# Patient Record
Sex: FEMALE | Race: WHITE | HISPANIC OR LATINO | Employment: PART TIME | URBAN - NONMETROPOLITAN AREA
[De-identification: names, ages, dates, MRNs, and addresses within clinical notes are randomized per-mention and may not be internally consistent; named-entity substitution may affect disease eponyms.]

---

## 2017-11-29 ENCOUNTER — COMMUNICATION - GICH (OUTPATIENT)
Dept: OBGYN | Facility: OTHER | Age: 27
End: 2017-11-29

## 2017-11-29 DIAGNOSIS — Z34.90 ENCOUNTER FOR SUPERVISION OF NORMAL PREGNANCY: ICD-10-CM

## 2017-12-21 ENCOUNTER — PRENATAL OFFICE VISIT - GICH (OUTPATIENT)
Dept: OBGYN | Facility: OTHER | Age: 27
End: 2017-12-21

## 2017-12-21 ENCOUNTER — HISTORY (OUTPATIENT)
Dept: OBGYN | Facility: OTHER | Age: 27
End: 2017-12-21

## 2017-12-21 ENCOUNTER — HOSPITAL ENCOUNTER (OUTPATIENT)
Dept: RADIOLOGY | Facility: OTHER | Age: 27
End: 2017-12-21

## 2017-12-21 ENCOUNTER — AMBULATORY - GICH (OUTPATIENT)
Dept: OBGYN | Facility: OTHER | Age: 27
End: 2017-12-21

## 2017-12-21 DIAGNOSIS — Z34.01 ENCOUNTER FOR SUPERVISION OF NORMAL FIRST PREGNANCY IN FIRST TRIMESTER: ICD-10-CM

## 2017-12-21 DIAGNOSIS — Z34.90 ENCOUNTER FOR SUPERVISION OF NORMAL PREGNANCY: ICD-10-CM

## 2017-12-21 LAB
ABORH - HISTORICAL: NORMAL
ABSOLUTE BASOPHILS - HISTORICAL: 0 THOU/CU MM
ABSOLUTE EOSINOPHILS - HISTORICAL: 0 THOU/CU MM
ABSOLUTE IMMATURE GRANULOCYTES(METAS,MYELOS,PROS) - HISTORICAL: 0 THOU/CU MM
ABSOLUTE LYMPHOCYTES - HISTORICAL: 1.8 THOU/CU MM (ref 0.9–2.9)
ABSOLUTE MONOCYTES - HISTORICAL: 0.6 THOU/CU MM
ABSOLUTE NEUTROPHILS - HISTORICAL: 11.5 THOU/CU MM (ref 1.7–7)
ANTIBODY SCREEN - HISTORICAL: NEGATIVE
BASOPHILS # BLD AUTO: 0.2 %
EOSINOPHIL NFR BLD AUTO: 0.1 %
ERYTHROCYTE [DISTWIDTH] IN BLOOD BY AUTOMATED COUNT: 12.7 % (ref 11.5–15.5)
HBSAG CATEGORY - HISTORICAL: NONREACTIVE
HCT VFR BLD AUTO: 40.7 % (ref 33–51)
HEMOGLOBIN: 14.1 G/DL (ref 12–16)
HIV-1/HIV-2 ANTIBODY CATEGORY - HISTORICAL: NORMAL
IMMATURE GRANULOCYTES(METAS,MYELOS,PROS) - HISTORICAL: 0.3 %
LYMPHOCYTES NFR BLD AUTO: 12.8 % (ref 20–44)
MCH RBC QN AUTO: 29.9 PG (ref 26–34)
MCHC RBC AUTO-ENTMCNC: 34.6 G/DL (ref 32–36)
MCV RBC AUTO: 86 FL (ref 80–100)
MONOCYTES NFR BLD AUTO: 4 %
NEUTROPHILS NFR BLD AUTO: 82.6 % (ref 42–72)
PLATELET # BLD AUTO: 331 THOU/CU MM (ref 140–440)
PMV BLD: 10.6 FL (ref 6.5–11)
RED BLOOD COUNT - HISTORICAL: 4.72 MIL/CU MM (ref 4–5.2)
RUBELLA COMMENT - HISTORICAL: NORMAL
SPECIMEN EXPIRATION DATE/TIME - HISTORICAL: NORMAL
WHITE BLOOD COUNT - HISTORICAL: 13.9 THOU/CU MM (ref 4.5–11)

## 2017-12-21 ASSESSMENT — PATIENT HEALTH QUESTIONNAIRE - PHQ9: SUM OF ALL RESPONSES TO PHQ QUESTIONS 1-9: 9

## 2017-12-22 ENCOUNTER — AMBULATORY - GICH (OUTPATIENT)
Dept: SCHEDULING | Facility: OTHER | Age: 27
End: 2017-12-22

## 2017-12-22 LAB — TREPONEMA PALLIDUM - HISTORICAL: NEGATIVE

## 2017-12-28 NOTE — TELEPHONE ENCOUNTER
Patient Information     Patient Name MRN Tracey Bhakta [id number] Female 1990      Telephone Encounter by Gauri Spear at 2017  3:41 PM     Author:  Gauri Spear Service:  (none) Author Type:  (none)     Filed:  2017  3:45 PM Encounter Date:  2017 Status:  Signed     :  Gauri Spear            Patient is scheduled with [facility] for her Initial Ob Physical on 2017 with an LMP of 10/22/2017. Patient would like an Ultrasound prior to appointment. Is this something we are able to do? Please advise

## 2018-01-05 ENCOUNTER — COMMUNICATION - GICH (OUTPATIENT)
Dept: OBGYN | Facility: OTHER | Age: 28
End: 2018-01-05

## 2018-01-05 ENCOUNTER — AMBULATORY - GICH (OUTPATIENT)
Dept: OBGYN | Facility: OTHER | Age: 28
End: 2018-01-05

## 2018-01-05 ENCOUNTER — HOSPITAL ENCOUNTER (OUTPATIENT)
Dept: RADIOLOGY | Facility: OTHER | Age: 28
End: 2018-01-05

## 2018-01-05 ENCOUNTER — PRENATAL OFFICE VISIT - GICH (OUTPATIENT)
Dept: OBGYN | Facility: OTHER | Age: 28
End: 2018-01-05

## 2018-01-05 ENCOUNTER — HISTORY (OUTPATIENT)
Dept: OBGYN | Facility: OTHER | Age: 28
End: 2018-01-05

## 2018-01-05 DIAGNOSIS — O03.4 INCOMPLETE SPONTANEOUS ABORTION WITHOUT COMPLICATION: ICD-10-CM

## 2018-01-05 DIAGNOSIS — O20.0 THREATENED ABORTION: ICD-10-CM

## 2018-01-19 ENCOUNTER — AMBULATORY - GICH (OUTPATIENT)
Dept: SCHEDULING | Facility: OTHER | Age: 28
End: 2018-01-19

## 2018-01-23 ENCOUNTER — AMBULATORY - GICH (OUTPATIENT)
Dept: SCHEDULING | Facility: OTHER | Age: 28
End: 2018-01-23

## 2018-02-09 VITALS — HEART RATE: 80 BPM | DIASTOLIC BLOOD PRESSURE: 64 MMHG | WEIGHT: 113.6 LBS | SYSTOLIC BLOOD PRESSURE: 104 MMHG

## 2018-02-09 VITALS — SYSTOLIC BLOOD PRESSURE: 118 MMHG | TEMPERATURE: 98.9 F | DIASTOLIC BLOOD PRESSURE: 68 MMHG | HEART RATE: 88 BPM

## 2018-02-11 ASSESSMENT — PATIENT HEALTH QUESTIONNAIRE - PHQ9: SUM OF ALL RESPONSES TO PHQ QUESTIONS 1-9: 9

## 2018-02-12 NOTE — PROGRESS NOTES
Patient Information     Patient Name MRN Sex Tracey Centeno [id number] Female 1990      Progress Notes by Alla Bustamante MD at 2018  9:30 AM     Author:  Alla Bustamante MD  Service:  (none) Author Type:  Physician     Filed:  2018  1:44 PM  Encounter Date:  2018 Status:  Addendum     :  Alla Bustamante MD (Physician)        Related Notes: Original Note by Alla Bustamante MD (Physician) filed at 2018 12:31 PM            [facility]  Follow-Up Visit    S: Ms. Tracey Caban is a 27 y.o.  at 11w3d by LMP c/w 9w2d US here for vaginal bleeding. She reports onset of heavy vaginal bleeding yesterday afternoon. It was heavy with passage of clots and some tissue for 2-3 hours, then continued moderately heavy overnight and is now lighter this morning. The bleeding was associated with significant cramping.     O:  /68 (Cuff Site: Right Arm, Position: Sitting, Cuff Size: Adult Regular)  Pulse 88  Temp 98.9  F (37.2  C) (Tympanic)   LMP 10/17/2017  Gen: Well-appearing, NAD  Resp: nonlabored  Psych: appropriate mood and affect    Pelvic US 2018:  FINDINGS:  A fetal pole is no longer seen. There is heterogeneous, mixed echogenicity material within the endometrium, likely a combination of blood products and retained products of conception. The uterus is otherwise unremarkable. The ovaries are unremarkable.  IMPRESSION: Fetal pole is no longer seen. Heterogeneous material in the endometrium likely represents blood products/retained products of conception. The findings are consistent with fetal demise.      O Rh Positive    A/P:  Ms. Tracey Caban is a 27 y.o.  at 11w3d here for incomplete . Expressed condolences to patient and her boyfriend and reiterated that she did not cause this. Ultrasound with some retained clot and possibly tissue. Offered expectant management vs misoprostol vs D&C. Patient wanted to try misoprostol.  Reviewed precautions and reasons to return for care. This pregnancy was unplanned but desired. They are unsure if they want to start trying again or start contraception, will call clinic if she wants a script for OCPs    Alla Bustamante MD  OB/GYN  1/5/2018 12:24 PM

## 2018-02-12 NOTE — NURSING NOTE
Patient Information     Patient Name MRTracey Pink [id number] Female 1990      Nursing Note by Na Trejo RN at 2018  9:30 AM     Author:  Na Trejo RN Service:  (none) Author Type:  NURS- Registered Nurse     Filed:  2018 10:16 AM Encounter Date:  2018 Status:  Signed     :  Na Trejo RN (NURS- Registered Nurse)            Patient is here for follow up on recent ultrasound. Vaginal bleeding started yesterday after noon, currently bleeding. Bleeding is lighter today than yesterday. Reports passing tissue yesterday.  Na Trejo RN............. 2018 10:03 AM

## 2018-02-12 NOTE — TELEPHONE ENCOUNTER
Patient Information     Patient Name MRTracey Pink [id number] Female 1990      Telephone Encounter by Na Trejo RN at 2018  1:19 PM     Author:  Na Trejo RN Service:  (none) Author Type:  NURS- Registered Nurse     Filed:  2018  1:23 PM Encounter Date:  2018 Status:  Signed     :  Na Trejo RN (NURS- Registered Nurse)            Patient states that she missed a call from [facility] - wanted to make sure it was not anything regarding her appointment she had earlier. This nurse confirmed with Dr Alla Bustamante MD, nothing needed from this patient.  Na Trejo RN............. 2018 1:23 PM

## 2018-02-12 NOTE — NURSING NOTE
Patient Information     Patient Name MRN Tracey Bhakta [id number] Female 1990      Nursing Note by Evi Nickerson at 2017  8:30 AM     Author:  Evi Nickerson Service:  (none) Author Type:  (none)     Filed:  2017  8:41 AM Encounter Date:  2017 Status:  Signed     :  Evi Nickerson            Pt presents for initial Ob.  Evi Nickerson

## 2018-02-12 NOTE — PROGRESS NOTES
Patient Information     Patient Name MRN Tracey Bhakta [id number] Female 1990      Progress Notes by Alla Bustamante MD at 2017  8:30 AM     Author:  Alla Bustamante MD Service:  (none) Author Type:  Physician     Filed:  2017  9:42 AM Encounter Date:  2017 Status:  Signed     :  Alla Bustamante MD (Physician)            INITIAL OB VISIT    HPI  Tracey Caban is a 27 y.o. female  at 9w2d by LMP c/w 9w2d US who presents for first OB visit. This pregnancy was unplanned, but welcome. Was taking OCPs when she became pregnant. She just moved to [place] within the past week and started a new job 4 days ago. She has been feeling very emotional with crying spells. She does not feel depressed but has been anxious. Wonders if this is related to pregnancy. Had some anxiety prior to pregnancy but was never officially diagnosed or ever on medications.     SYMPTOMS SINCE LMP  Fatigue: Yes  Nausea: Yes- improving  Emesis: No  Bleeding: No  Breast tenderness: No    MENSTRUAL HISTORY  LMP:Patient's last menstrual period was 10/17/2017.  Definite:  Yes  Menses monthly:  Yes on OCPs  On contraception at conception:  Yes    PAST PREGNANCIES  OB History                    Para  Term     AB  Living     1                 SAB   TAB  Ectopic  Multiple   Live Births                                         # Outcome Date  GA  Lbr Artemio/2nd  Weight Sex  Delivery Anes PTL Lv   1 Current                                     PAST MEDICAL/SURGICAL HISTORY  PMH: breast fibromas, osteopenia while on Depo  PSH: negative    Current Outpatient Prescriptions       Medication  Sig Dispense Refill     prenatal vitamin chewable (NATACHEW) 29 mg iron- 1 mg chew Take 1 tablet by mouth once daily. 90 Tab 3     No current facility-administered medications for this visit.      Medications have been reviewed by me and are current to the best of my knowledge and  ability.      Allergies: Macrobid [nitrofurantoin monohyd/m-cryst]    SOCIAL HISTORY  Tobacco:  No  Alcohol:  No  Drugs:  No  Single, lives with her boyfriend Z[place]. Together x2 years. Originally from [place], recently moved from [facility]. Started new job at [employer]    FamH: Maternal grandmother- DM. Maternal grandfather- heart issues, pacemaker, HTN, Parkinsons. Mother- Gestational DM x2. Paternal grandfather- HTN. Paternal grandmother- osteoporosis    GENETIC SCREENING:  Maternal pertinent postives: No  Paternal pertinent positives: No    INFECTION HISTORY  Patient or partner with hx HSV:No  Rash or viral illness since LMP:No  Hepatitis B or C: No  STD (GC, Chlamydia, HPV):No      ROS: see HPI, complete ROS otherwise negative    PHYSICAL EXAM  /64 (Cuff Site: Right Arm, Position: Sitting, Cuff Size: Adult Regular)  Pulse 80  Wt 51.5 kg (113 lb 9.6 oz)  LMP 10/17/2017  Breastfeeding? No   General: Pleasant WN female, A and O x3, NAD  HEENT : Grossly normal  Neck: Thyroid normal size, with no nodules, no adenopathy  Lungs: Clear, good AE, no rales or rhonchi  Heart: RRR no murmur , NL S1 and S2  Abdomen: Soft NT, ND, no masses, normal BS  Ext: No edema    Pelvic:  EGBUS Normal  Cervix Normal  Uterus nontender, 10 wk size  Adnexa, neg for tenderness or mass  Cx closed /Long / High    US 2017:   Gestation number: Single  Cardiac activity:  Present  Heart rate:  167 bpm  Measurements:    CRL:  9 weeks 2 days  Adnexa:  Right ovary unremarkable. Left ovary not visualized.  Ultrasound Age:  9 weeks 2 days  Ultrasound EDC:  2018  There is a small subchorionic hemorrhage measuring 1.8 x 0.3 x 1.2 cm encompassing less than 25% of the gestational sac diameter.  Impression:  Single intrauterine pregnancy with estimated gestational age of 9 weeks 2 days and estimated delivery date of 2018. Small subchorionic hemorrhage.    IMPRESSION   IUP at 9w2d weeks by LMP c/w 9w2d US    Risk factors  identified: none  High risk pregnancy: No  Repeat C/S planned: No      PLAN:  Discussed genetic testing available: Yes- desires but wants to check with her new insurance  1st trimester US ordered/completed: Yes  Anesthesia consult needed: No  OB/Gyn or MFM referral: No    GC/Chlam screening, routine labs ordered  Prenatal vitamin daily  Routine 1st trimester anticipatory guidance  Return to office in four weeks for follow up or sooner prn.  Questions answered.    Alla Bustamante MD  OB/GYN  12/21/2017 9:35 AM

## 2018-02-12 NOTE — PROGRESS NOTES
Patient Information     Patient Name MRN Tracey Bhakta [id number] Female 1990      Progress Notes by Consuelo Junior R.T. (ARRT) at 2017  8:01 AM     Author:  Consuelo Junior R.T. (ARRT) Service:  (none) Author Type:  [employer] - Registered Radiologic Technologist     Filed:  2017  8:01 AM Date of Service:  2017  8:01 AM Status:  Signed     :  Consuelo Junior R.T. (ARRT) ([employer] - Registered Radiologic Technologist)            Falls Risk Criteria:    Age 65 and older or under age 4        Sensory deficits    Poor vision    Use of ambulatory aides    Impaired judgment    Unable to walk independently    Meets High Risk criteria for falls:  no

## 2018-03-01 ENCOUNTER — DOCUMENTATION ONLY (OUTPATIENT)
Dept: FAMILY MEDICINE | Facility: OTHER | Age: 28
End: 2018-03-01

## 2018-08-13 ENCOUNTER — TELEPHONE (OUTPATIENT)
Dept: OBGYN | Facility: OTHER | Age: 28
End: 2018-08-13

## 2018-08-13 DIAGNOSIS — Z32.01 POSITIVE PREGNANCY TEST: Primary | ICD-10-CM

## 2018-08-13 NOTE — TELEPHONE ENCOUNTER
Tried calling patient to schedule U/S, invalid phone number/phone disconnected. Sent patient letter. Thank you.

## 2018-08-13 NOTE — TELEPHONE ENCOUNTER
Tom Ramos Patient:     Positive pregnancy test : Yes, Friday   Last Annual/ Pap: last december      P:0  LMP : 2018 GA: 6w5d  Prenatal vitamins?: Yes   Bleeding?: No  Cramping?: Yes, period like cramping   1-sided pelvic pain?: No   Advised patient to be seen ASAP if any of the above symptoms.    Order pended for dating US.     Carmen appt scheduled with Tom on @ 11

## 2018-08-14 ENCOUNTER — HEALTH MAINTENANCE LETTER (OUTPATIENT)
Age: 28
End: 2018-08-14

## 2018-08-24 ENCOUNTER — HOSPITAL ENCOUNTER (OUTPATIENT)
Dept: ULTRASOUND IMAGING | Facility: HOSPITAL | Age: 28
Discharge: HOME | End: 2018-08-24
Attending: ADVANCED PRACTICE MIDWIFE | Admitting: ADVANCED PRACTICE MIDWIFE
Payer: COMMERCIAL

## 2018-08-24 DIAGNOSIS — Z32.01 POSITIVE PREGNANCY TEST: ICD-10-CM

## 2018-08-24 PROCEDURE — 76817 TRANSVAGINAL US OBSTETRIC: CPT | Mod: TC

## 2018-09-12 ENCOUNTER — PRENATAL OFFICE VISIT (OUTPATIENT)
Dept: OBGYN | Facility: OTHER | Age: 28
End: 2018-09-12
Attending: ADVANCED PRACTICE MIDWIFE
Payer: COMMERCIAL

## 2018-09-12 VITALS
DIASTOLIC BLOOD PRESSURE: 68 MMHG | WEIGHT: 117 LBS | SYSTOLIC BLOOD PRESSURE: 104 MMHG | BODY MASS INDEX: 19.49 KG/M2 | HEIGHT: 65 IN

## 2018-09-12 DIAGNOSIS — Z34.81 ENCOUNTER FOR SUPERVISION OF OTHER NORMAL PREGNANCY, FIRST TRIMESTER: ICD-10-CM

## 2018-09-12 DIAGNOSIS — Z12.4 ENCOUNTER FOR SCREENING FOR CERVICAL CANCER: ICD-10-CM

## 2018-09-12 DIAGNOSIS — Z34.81 ENCOUNTER FOR SUPERVISION OF OTHER NORMAL PREGNANCY IN FIRST TRIMESTER: Primary | ICD-10-CM

## 2018-09-12 DIAGNOSIS — Z11.3 SCREEN FOR STD (SEXUALLY TRANSMITTED DISEASE): ICD-10-CM

## 2018-09-12 LAB
ALBUMIN UR-MCNC: NEGATIVE MG/DL
AMPHETAMINES UR QL SCN: NEGATIVE
APPEARANCE UR: CLEAR
BACTERIA #/AREA URNS HPF: ABNORMAL /HPF
BARBITURATES UR QL: NEGATIVE
BENZODIAZ UR QL: NEGATIVE
BILIRUB UR QL STRIP: NEGATIVE
CANNABINOIDS UR QL SCN: NEGATIVE
COCAINE UR QL: NEGATIVE
COLOR UR AUTO: ABNORMAL
ERYTHROCYTE [DISTWIDTH] IN BLOOD BY AUTOMATED COUNT: 13.1 % (ref 10–15)
GLUCOSE UR STRIP-MCNC: NEGATIVE MG/DL
HCT VFR BLD AUTO: 43.8 % (ref 35–47)
HGB BLD-MCNC: 15.2 G/DL (ref 11.7–15.7)
HGB UR QL STRIP: NEGATIVE
KETONES UR STRIP-MCNC: NEGATIVE MG/DL
LEUKOCYTE ESTERASE UR QL STRIP: NEGATIVE
MCH RBC QN AUTO: 29.8 PG (ref 26.5–33)
MCHC RBC AUTO-ENTMCNC: 34.7 G/DL (ref 31.5–36.5)
MCV RBC AUTO: 86 FL (ref 78–100)
METHADONE UR QL SCN: NEGATIVE
NITRATE UR QL: NEGATIVE
OPIATES UR QL SCN: NEGATIVE
PCP UR QL SCN: NEGATIVE
PH UR STRIP: 7 PH (ref 4.7–8)
PLATELET # BLD AUTO: 296 10E9/L (ref 150–450)
RBC # BLD AUTO: 5.1 10E12/L (ref 3.8–5.2)
RBC #/AREA URNS AUTO: 0 /HPF (ref 0–2)
SOURCE: ABNORMAL
SP GR UR STRIP: 1.01 (ref 1–1.03)
SPECIMEN SOURCE: NORMAL
UROBILINOGEN UR STRIP-MCNC: NORMAL MG/DL (ref 0–2)
WBC # BLD AUTO: 9.9 10E9/L (ref 4–11)
WBC #/AREA URNS AUTO: 0 /HPF (ref 0–5)
WET PREP SPEC: NORMAL

## 2018-09-12 PROCEDURE — 87491 CHLMYD TRACH DNA AMP PROBE: CPT | Mod: ZL | Performed by: ADVANCED PRACTICE MIDWIFE

## 2018-09-12 PROCEDURE — G0123 SCREEN CERV/VAG THIN LAYER: HCPCS | Mod: ZL | Performed by: ADVANCED PRACTICE MIDWIFE

## 2018-09-12 PROCEDURE — 88142 CYTOPATH C/V THIN LAYER: CPT | Performed by: ADVANCED PRACTICE MIDWIFE

## 2018-09-12 PROCEDURE — 80307 DRUG TEST PRSMV CHEM ANLYZR: CPT | Mod: ZL | Performed by: ADVANCED PRACTICE MIDWIFE

## 2018-09-12 PROCEDURE — 86850 RBC ANTIBODY SCREEN: CPT | Mod: ZL | Performed by: ADVANCED PRACTICE MIDWIFE

## 2018-09-12 PROCEDURE — 87210 SMEAR WET MOUNT SALINE/INK: CPT | Mod: ZL | Performed by: ADVANCED PRACTICE MIDWIFE

## 2018-09-12 PROCEDURE — 36415 COLL VENOUS BLD VENIPUNCTURE: CPT | Mod: ZL | Performed by: ADVANCED PRACTICE MIDWIFE

## 2018-09-12 PROCEDURE — 85027 COMPLETE CBC AUTOMATED: CPT | Mod: ZL | Performed by: ADVANCED PRACTICE MIDWIFE

## 2018-09-12 PROCEDURE — 86901 BLOOD TYPING SEROLOGIC RH(D): CPT | Mod: ZL | Performed by: ADVANCED PRACTICE MIDWIFE

## 2018-09-12 PROCEDURE — 86900 BLOOD TYPING SEROLOGIC ABO: CPT | Mod: ZL | Performed by: ADVANCED PRACTICE MIDWIFE

## 2018-09-12 PROCEDURE — G0463 HOSPITAL OUTPT CLINIC VISIT: HCPCS | Mod: 25

## 2018-09-12 PROCEDURE — 86780 TREPONEMA PALLIDUM: CPT | Mod: ZL | Performed by: ADVANCED PRACTICE MIDWIFE

## 2018-09-12 PROCEDURE — 87591 N.GONORRHOEAE DNA AMP PROB: CPT | Mod: ZL | Performed by: ADVANCED PRACTICE MIDWIFE

## 2018-09-12 PROCEDURE — 99000 SPECIMEN HANDLING OFFICE-LAB: CPT | Performed by: ADVANCED PRACTICE MIDWIFE

## 2018-09-12 PROCEDURE — 81001 URINALYSIS AUTO W/SCOPE: CPT | Mod: ZL | Performed by: ADVANCED PRACTICE MIDWIFE

## 2018-09-12 PROCEDURE — 86762 RUBELLA ANTIBODY: CPT | Mod: ZL | Performed by: ADVANCED PRACTICE MIDWIFE

## 2018-09-12 PROCEDURE — 99207 ZZC FIRST OB VISIT: CPT | Performed by: ADVANCED PRACTICE MIDWIFE

## 2018-09-12 PROCEDURE — 87340 HEPATITIS B SURFACE AG IA: CPT | Mod: ZL | Performed by: ADVANCED PRACTICE MIDWIFE

## 2018-09-12 PROCEDURE — 87389 HIV-1 AG W/HIV-1&-2 AB AG IA: CPT | Mod: ZL | Performed by: ADVANCED PRACTICE MIDWIFE

## 2018-09-12 PROCEDURE — 76815 OB US LIMITED FETUS(S): CPT | Mod: TC | Performed by: ADVANCED PRACTICE MIDWIFE

## 2018-09-12 RX ORDER — PRENATAL VIT/IRON FUM/FOLIC AC 27MG-0.8MG
1 TABLET ORAL DAILY
COMMUNITY
End: 2020-06-23

## 2018-09-12 ASSESSMENT — ANXIETY QUESTIONNAIRES
6. BECOMING EASILY ANNOYED OR IRRITABLE: SEVERAL DAYS
2. NOT BEING ABLE TO STOP OR CONTROL WORRYING: SEVERAL DAYS
4. TROUBLE RELAXING: NEARLY EVERY DAY
IF YOU CHECKED OFF ANY PROBLEMS ON THIS QUESTIONNAIRE, HOW DIFFICULT HAVE THESE PROBLEMS MADE IT FOR YOU TO DO YOUR WORK, TAKE CARE OF THINGS AT HOME, OR GET ALONG WITH OTHER PEOPLE: SOMEWHAT DIFFICULT
7. FEELING AFRAID AS IF SOMETHING AWFUL MIGHT HAPPEN: SEVERAL DAYS
3. WORRYING TOO MUCH ABOUT DIFFERENT THINGS: SEVERAL DAYS
5. BEING SO RESTLESS THAT IT IS HARD TO SIT STILL: MORE THAN HALF THE DAYS
1. FEELING NERVOUS, ANXIOUS, OR ON EDGE: NOT AT ALL
GAD7 TOTAL SCORE: 9

## 2018-09-12 ASSESSMENT — PAIN SCALES - GENERAL: PAINLEVEL: NO PAIN (0)

## 2018-09-12 NOTE — PATIENT INSTRUCTIONS
Return to office in 4 weeks for prenatal care and as needed.    Thank you for allowing Tom BRUCE CNM and our OB team to participate in your care.  If you have a scheduling or an appointment question please contact [facility] Unit Coordinator at their direct line [phone].   ALL nursing questions or concerns can be directed to your OB nurse at: [phone] Federico Roach/[place]

## 2018-09-12 NOTE — PROGRESS NOTES
NEW OB VISIT  Tracey Caban is a 27 year old  at 11w0d presenting for a new ob visit.      Currently taking Prenatal Vitamins? y  Folate y    ZIKA n    MEDICAL HISTORY:  Diabetes: No  Hypertension: No  Heart Disease: No  Autoimmune disorder: No  Kidney Disease/UTI: No  Neurologic Disease/Epilepsy:No  Psychiatric Disease: No  Depression/Postpartum Depression:No  Varicositites/Phlebitis: No  Hepatitis/Liver Disease: No  Thyroid Dysfunction: No  Trauma/Violence: No  History of Blood Transfusion: No  Tobacco Use: No  Alcohol Use: No  Illicit/Recreational Drugs: No  D (Rh Sensitized): unsure  Pulmonary Disease (TB/Asthma): No  Drug/Latex Allergies/Reactions: Yes, Macrobid  Breast: No  GYN Surgery:No  Operations/Hospitalizations:Yes, wisdom teeth  Anesthetic Complications: No  History of Abnormal Pap:No  Uterine Anomalies/SANIYA: No  Infertility: No  Artifical Reproductive Technologies Treatment: No  Relevant Family History:No  Other/Comments: No    INFECTION HISTORY:  Are you exposed to TB anywhere you work or live?: n  Do you or your Partner have Genital Herpes: n  Rash or viral illness or fever since LMP: n  Hepatitis B or C: n  History of STI (Gonorrhea, Chlamydia, HPV, HIV, Syphilis): n  Other: n  Cats n    BABY DOC unsure             Breast feeding: y   Card given y      IMMUNIZATION HISTORY:  Chicken Pox: y  Flu Vaccine:  n  Pneumococcal if smoker or Reactive Airway Disease:  n  Tdap: 28 weeks  HPV vaccinations (Gardasil): n  Other/comments: n    FAMILY HISTORY  Diabetes: mgm, mother had GDM  Hypertension: pgf, mgf, mgm  CVA/Stroke: mgf  Lupus: n  Cancers: Breast  n ovarian n,colon n,uterine: n           Genetics Screening/Teratology Counseling:  Includes Patient, Baby's Father, or anyone in either family with:  Patient's age 35 years or older as of estimated date of delivery:  n  Thalassemia: MCV less than 80: n  Neural Tube defects: n  Congenital Heart Defects: n  Down syndrome: n  Trace-Sachs: n  Canavan  "Disease: n  Familial Dysautonomia: n  Sickle Cell Disease or Trait: n  Hemophilia or other blood disorders: n  Muscular Dystrophy: n  Cystic Fibrosis: n  [place]'s Chorea: n  Intellectual development disorder or Autism: n  Other genetic or chromosomal disorders: n  Maternal Metabolic Disorder (Type 1 DM, PKU): n  Patient or baby's father with birth defects not listed above: n  Recurrent pregnancy loss or stillbirth: n  Medications (Supplements, drugs)/ Illicit/ Recreational drugs/ Alcohol since LMP: n  Other/Comments: n    Review Of Systems:   CONSTITUTIONAL:     NEGATIVE for fever, chills, change in weight  INTEGUMENTARY/SKIN:       NEGATIVE for worrisome rashes, moles or lesions  EYES:     NEGATIVE for vision changes or irritation  ENT/MOUTH: NEGATIVE for ear, mouth and throat problems  RESP:     NEGATIVE for significant cough or SOB  CV:   NEGATIVE for chest pain, palpitations or peripheral edema  GI:     NEGATIVE for unusual nausea, abdominal pain, heartburn, or change in bowel   :   NEGATIVE for frequency, dysuria, hematuria, vaginal discharge or bleeding  MUSCULOSKELETAL:     NEGATIVE for significant arthralgias or myalgia  NEURO:      NEGATIVE for weakness, dizziness or paresthesias  ENDOCRINE:      NEGATIVE for temperature intolerance, skin/hair changes  PSYCHIATRIC:      NEGATIVE for changes in mood or affect.     PHYSICAL EXAM:   /68 (BP Location: Left arm, Patient Position: Chair, Cuff Size: Adult Regular)  Ht 5' 5\" (1.651 m)  Wt 117 lb (53.1 kg)  LMP 06/27/2018  BMI 19.47 kg/m2   BMI: Body mass index is 19.47 kg/(m^2).  Constitutional: healthy, alert and no distress  Head: Normocephalic. No masses, lesions, tenderness or abnormalities  Neck: Neck supple. Trachea midline. No adenopathy. Thyroid symmetric, normal size.   Cardiovascular: RRR.   Respiratory: lungs clear   Breast: Breasts reveal mild symmetric fibrocystic densities, but there are no dominant, discrete, fixed or suspicious " masses found.  Gastrointestinal: Abdomen soft, non-tender, non-distended. No masses, organomegaly.  Pelvic:  Vulva:  No external lesions, normal female hair distribution, no inguinal adenopathy.    Urethra:  Midline, non-tender, well supported, no discharge  Vagina:  Moist, pink, no abnormal discharge, no lesions  Uterus:    , non-tender  Ovaries:  No masses appreciated  Rectal Exam: deferred    Musculoskeletal: extremities normal  Skin: no suspicious lesions or rashes  Psychiatric: Affect appropriate, cooperative,mentation appears normal.     Risk assessment done. Level is   moderate    ASSESSMENT:    @ 11 weeks  Hx of early SAB  Overwhelmed emotionally  Cardiac activity on U/S      PLAN:  Prenatal labs   11-13 weeks 1st trimester Nuchal Translucency/Bloodwork  15-16 wk MSAFP   Level II Ultrasound at 20 weeks   Tdap at 27 weeks  Estimated Fetal Weight at 38 weeks prn   Seeing a MH therapist  U/S for fetal activity    Return to Office:  4 weeks prenatal care and as needed    Greater than 45 were spent in face to face counseling and interview by me for this initial new ob visit.  Tom BRUCE, CNM

## 2018-09-12 NOTE — NURSING NOTE
"Chief Complaint   Patient presents with     Prenatal Care     11w0d       Initial /68 (BP Location: Left arm, Patient Position: Chair, Cuff Size: Adult Regular)  Ht 5' 5\" (1.651 m)  Wt 117 lb (53.1 kg)  LMP 06/27/2018  BMI 19.47 kg/m2 Estimated body mass index is 19.47 kg/(m^2) as calculated from the following:    Height as of this encounter: 5' 5\" (1.651 m).    Weight as of this encounter: 117 lb (53.1 kg).  Medication Reconciliation: complete    Marley Mckeon LPN    "

## 2018-09-12 NOTE — MR AVS SNAPSHOT
After Visit Summary   9/12/2018    Tracey Caban    MRN: [id number]           Patient Information     Date Of Birth          1990        Visit Information        Provider Department      9/12/2018 11:00 AM Tom Ramos APRN CNM [facility] [place]        Today's Diagnoses     Encounter for supervision of other normal pregnancy in first trimester    -  1    Encounter for supervision of other normal pregnancy, first trimester        Encounter for screening for cervical cancer         Screen for STD (sexually transmitted disease)          Care Instructions    Return to office in 4 weeks for prenatal care and as needed.    Thank you for allowing Tom BRUCE CNM and our OB team to participate in your care.  If you have a scheduling or an appointment question please contact [facility] Unit Coordinator at their direct line [phone].   ALL nursing questions or concerns can be directed to your OB nurse at: [phone] - aMrley/[place]ma               Follow-ups after your visit        Your next 10 appointments already scheduled     Oct 10, 2018 11:00 AM CDT   (Arrive by 10:45 AM)   ESTABLISHED PRENATAL with TEO Dhillon CNM   [facility] [place] ([facility] )    [address]  [place] [address]   [phone]              Future tests that were ordered for you today     Open Future Orders        Priority Expected Expires Ordered    MFM Telemedicine US Comprehensive Single Routine 10/31/2018 12/4/2018 9/12/2018    US OB < 14 Weeks Nuchal Translucency Routine 9/12/2018 10/2/2018 9/12/2018    First Trimester Screen Biochem Markers Routine 9/12/2018 10/2/2018 9/12/2018    Alpha fetoprotein maternal screen Routine 10/10/2018 11/14/2018 9/12/2018            Who to contact     If you have questions or need follow up information about today's clinic visit or your schedule please contact [facility] directly at [phone].  Normal or  "non-critical lab and imaging results will be communicated to you by MyChart, letter or phone within 4 business days after the clinic has received the results. If you do not hear from us within 7 days, please contact the clinic through MyChart or phone. If you have a critical or abnormal lab result, we will notify you by phone as soon as possible.  Submit refill requests through [employer] or call your pharmacy and they will forward the refill request to us. Please allow 3 business days for your refill to be completed.          Additional Information About Your Visit        Care EveryWhere ID     This is your Care EveryWhere ID. This could be used by other organizations to access your [facility] medical records  [id number]        Your Vitals Were     Height Last Period BMI (Body Mass Index)             5' 5\" (1.651 m) 06/27/2018 19.47 kg/m2          Blood Pressure from Last 3 Encounters:   09/12/18 104/68   01/05/18 118/68   12/21/17 104/64    Weight from Last 3 Encounters:   09/12/18 117 lb (53.1 kg)   12/21/17 113 lb 9.6 oz (51.5 kg)              We Performed the Following     A pap thin layer screen reflex to HPV if ASCUS - recommend age 25 - 29     ABO/Rh type and screen     CBC with platelets     Drug Screen Urine (Range)     GC/Chlamydia by PCR - HI,GH     Hepatitis B surface antigen     HIV Antigen Antibody Combo     Rubella Antibody IgG Quantitative     Treponema Abs w Reflex to RPR and Titer     UA with Microscopic reflex to Culture     US OB LIMITED, 1 OR MORE FETUSES     Wet prep        Primary Care Provider Office Phone # Fax #    Alla Bustamante [phone] [address] [facility] [address]        Equal Access to Services     [facility]: Hadii juan Mcgowan, nyasia mosqueda, qaybpedro rawlsalgibran rg. So [facility] [phone].    ATENCIÓN: Si habla español, tiene a whitaker disposición servicios gratuitos de asistencia " lingüística. Candelario al [phone].    We comply with applicable federal civil rights laws and [place] laws. We do not discriminate on the basis of race, color, national origin, age, disability, sex, sexual orientation, or gender identity.            Thank you!     Thank you for choosing [facility]  for your care. Our goal is always to provide you with excellent care. Hearing back from our patients is one way we can continue to improve our services. Please take a few minutes to complete the written survey that you may receive in the mail after your visit with us. Thank you!             Your Updated Medication List - Protect others around you: Learn how to safely use, store and throw away your medicines at www.disposemymeds.org.          This list is accurate as of 9/12/18  5:50 PM.  Always use your most recent med list.                   Brand Name Dispense Instructions for use Diagnosis    prenatal multivitamin plus iron 27-0.8 MG Tabs per tablet      Take 1 tablet by mouth daily

## 2018-09-13 LAB
ABO + RH BLD: NORMAL
ABO + RH BLD: NORMAL
BLD GP AB SCN SERPL QL: NORMAL
BLOOD BANK CMNT PATIENT-IMP: NORMAL
BLOOD BANK CMNT PATIENT-IMP: NORMAL
C TRACH DNA SPEC QL PROBE+SIG AMP: NOT DETECTED
N GONORRHOEA DNA SPEC QL PROBE+SIG AMP: NOT DETECTED
SPECIMEN EXP DATE BLD: NORMAL
SPECIMEN SOURCE: NORMAL

## 2018-09-13 ASSESSMENT — ANXIETY QUESTIONNAIRES: GAD7 TOTAL SCORE: 9

## 2018-09-13 ASSESSMENT — PATIENT HEALTH QUESTIONNAIRE - PHQ9: SUM OF ALL RESPONSES TO PHQ QUESTIONS 1-9: 4

## 2018-09-14 LAB
HBV SURFACE AG SERPL QL IA: NONREACTIVE
HIV 1+2 AB+HIV1 P24 AG SERPL QL IA: NONREACTIVE

## 2018-09-15 LAB
RUBV IGG SERPL IA-ACNC: 21 IU/ML
T PALLIDUM AB SER QL: NONREACTIVE

## 2018-09-18 LAB
COPATH REPORT: NORMAL
PAP: NORMAL

## 2018-09-21 ENCOUNTER — HOSPITAL ENCOUNTER (OUTPATIENT)
Dept: ULTRASOUND IMAGING | Facility: HOSPITAL | Age: 28
Discharge: HOME | End: 2018-09-21
Attending: ADVANCED PRACTICE MIDWIFE | Admitting: ADVANCED PRACTICE MIDWIFE
Payer: COMMERCIAL

## 2018-09-21 DIAGNOSIS — Z34.81 ENCOUNTER FOR SUPERVISION OF OTHER NORMAL PREGNANCY IN FIRST TRIMESTER: ICD-10-CM

## 2018-09-21 PROCEDURE — 76813 OB US NUCHAL MEAS 1 GEST: CPT | Mod: TC

## 2018-09-21 PROCEDURE — 84704 HCG FREE BETACHAIN TEST: CPT | Performed by: ADVANCED PRACTICE MIDWIFE

## 2018-09-21 PROCEDURE — 36415 COLL VENOUS BLD VENIPUNCTURE: CPT | Performed by: ADVANCED PRACTICE MIDWIFE

## 2018-09-21 PROCEDURE — 84163 PAPPA SERUM: CPT | Performed by: ADVANCED PRACTICE MIDWIFE

## 2018-09-21 PROCEDURE — 82105 ALPHA-FETOPROTEIN SERUM: CPT | Performed by: ADVANCED PRACTICE MIDWIFE

## 2018-10-10 LAB — FIRST TRIMESTER SCREEN BIOCHEM MARKERS: NORMAL

## 2018-10-17 ENCOUNTER — PRENATAL OFFICE VISIT (OUTPATIENT)
Dept: OBGYN | Facility: OTHER | Age: 28
End: 2018-10-17
Attending: ADVANCED PRACTICE MIDWIFE
Payer: COMMERCIAL

## 2018-10-17 VITALS
HEIGHT: 65 IN | DIASTOLIC BLOOD PRESSURE: 60 MMHG | SYSTOLIC BLOOD PRESSURE: 98 MMHG | BODY MASS INDEX: 19.66 KG/M2 | WEIGHT: 118 LBS

## 2018-10-17 DIAGNOSIS — Z34.82 ENCOUNTER FOR SUPERVISION OF OTHER NORMAL PREGNANCY, SECOND TRIMESTER: Primary | ICD-10-CM

## 2018-10-17 DIAGNOSIS — N89.8 VAGINAL DISCHARGE: ICD-10-CM

## 2018-10-17 DIAGNOSIS — Z34.81 ENCOUNTER FOR SUPERVISION OF OTHER NORMAL PREGNANCY IN FIRST TRIMESTER: ICD-10-CM

## 2018-10-17 DIAGNOSIS — Z23 NEED FOR PROPHYLACTIC VACCINATION AND INOCULATION AGAINST INFLUENZA: ICD-10-CM

## 2018-10-17 LAB
SPECIMEN SOURCE: NORMAL
WET PREP SPEC: NORMAL

## 2018-10-17 PROCEDURE — 82105 ALPHA-FETOPROTEIN SERUM: CPT | Mod: ZL | Performed by: ADVANCED PRACTICE MIDWIFE

## 2018-10-17 PROCEDURE — 99207 ZZC PRENATAL VISIT: CPT | Performed by: ADVANCED PRACTICE MIDWIFE

## 2018-10-17 PROCEDURE — 36415 COLL VENOUS BLD VENIPUNCTURE: CPT | Mod: ZL | Performed by: ADVANCED PRACTICE MIDWIFE

## 2018-10-17 PROCEDURE — G0463 HOSPITAL OUTPT CLINIC VISIT: HCPCS | Mod: 25

## 2018-10-17 PROCEDURE — 99000 SPECIMEN HANDLING OFFICE-LAB: CPT | Performed by: ADVANCED PRACTICE MIDWIFE

## 2018-10-17 PROCEDURE — 90686 IIV4 VACC NO PRSV 0.5 ML IM: CPT | Performed by: ADVANCED PRACTICE MIDWIFE

## 2018-10-17 PROCEDURE — G0463 HOSPITAL OUTPT CLINIC VISIT: HCPCS

## 2018-10-17 PROCEDURE — 87210 SMEAR WET MOUNT SALINE/INK: CPT | Mod: ZL | Performed by: ADVANCED PRACTICE MIDWIFE

## 2018-10-17 PROCEDURE — 90471 IMMUNIZATION ADMIN: CPT | Performed by: ADVANCED PRACTICE MIDWIFE

## 2018-10-17 ASSESSMENT — PAIN SCALES - GENERAL: PAINLEVEL: NO PAIN (0)

## 2018-10-17 NOTE — PROGRESS NOTES
Doing well.  Fluttering  Denies contractions, bleeding, or leakage of fluid. Occasional cramping    Discussed:  MSAFP, vaginal discharge, treatment, breastfeeding benefits for mom and baby, fetal movement, flu vaccination     Plan:  Wet prep  Flu shot today  MSAFP today  U/S    Return to office in 4 weeks for prenatal care and as needed.    TEO Morrissey, CNM

## 2018-10-17 NOTE — NURSING NOTE
"Chief Complaint   Patient presents with     Prenatal Care     16w0d       Initial BP 98/60 (BP Location: Left arm, Patient Position: Chair, Cuff Size: Adult Regular)  Ht 5' 5\" (1.651 m)  Wt 118 lb (53.5 kg)  LMP 06/27/2018  BMI 19.64 kg/m2 Estimated body mass index is 19.64 kg/(m^2) as calculated from the following:    Height as of this encounter: 5' 5\" (1.651 m).    Weight as of this encounter: 118 lb (53.5 kg).  Medication Reconciliation: complete    Marley Mckeon LPN    "

## 2018-10-17 NOTE — PATIENT INSTRUCTIONS
Return to office in 4 weeks for prenatal care and as needed.    Thank you for allowing Tom BRUCE CNM and our OB team to participate in your care.  If you have a scheduling or an appointment question please contact [facility] Unit Coordinator at their direct line [phone].   ALL nursing questions or concerns can be directed to your OB nurse at: [phone] Federico Roach/[place]

## 2018-10-17 NOTE — PROGRESS NOTES

## 2018-10-17 NOTE — MR AVS SNAPSHOT
After Visit Summary   10/17/2018    Tracey Caban    MRN: [id number]           Patient Information     Date Of Birth          1990        Visit Information        Provider Department      10/17/2018 9:30 AM Tom Ramos APRN CNM [facility]        Today's Diagnoses     Encounter for supervision of other normal pregnancy, second trimester    -  1      Care Instructions    Return to office in 4 weeks for prenatal care and as needed.    Thank you for allowing Tom BRUCE CNM and our OB team to participate in your care.  If you have a scheduling or an appointment question please contact [facility] Unit Coordinator at their direct line [phone].   ALL nursing questions or concerns can be directed to your OB nurse at: [phone] - Marley/Selma               Follow-ups after your visit        Your next 10 appointments already scheduled     Nov 13, 2018 10:00 AM CST   (Arrive by 9:45 AM)   ESTABLISHED PRENATAL with TEO Dhillon CNM   [facility] ([facility] )    [address] [address] [phone]              Who to contact     If you have questions or need follow up information about today's clinic visit or your schedule please contact [facility] directly at [phone].  Normal or non-critical lab and imaging results will be communicated to you by MyChart, letter or phone within 4 business days after the clinic has received the results. If you do not hear from us within 7 days, please contact the clinic through MyChart or phone. If you have a critical or abnormal lab result, we will notify you by phone as soon as possible.  Submit refill requests through markedup or call your pharmacy and they will forward the refill request to us. Please allow 3 business days for your refill to be completed.          Additional Information About Your Visit        Care  "EveryWhere ID     This is your Care EveryWhere ID. This could be used by other organizations to access your [place] medical records  [id number]        Your Vitals Were     Height Last Period BMI (Body Mass Index)             5' 5\" (1.651 m) 06/27/2018 19.64 kg/m2          Blood Pressure from Last 3 Encounters:   10/17/18 98/60   09/12/18 104/68   01/05/18 118/68    Weight from Last 3 Encounters:   10/17/18 118 lb (53.5 kg)   09/12/18 117 lb (53.1 kg)   12/21/17 113 lb 9.6 oz (51.5 kg)              Today, you had the following     No orders found for display       Primary Care Provider Office Phone # Fax #    Alla Bustamante [phone] [address] [facility] [address]        Equal Access to Services     [facility]: Hadii aad ku hadasho Soomaali, waaxda luqadaha, qaybta kaalmada adeegyada, gibran gonzalez haycorina william . So [facility] [phone].    ATENCIÓN: Si habla español, tiene a whitaker disposición servicios gratuitos de asistencia lingüística. Llame al [phone].    We comply with applicable federal civil rights laws and [place] laws. We do not discriminate on the basis of race, color, national origin, age, disability, sex, sexual orientation, or gender identity.            Thank you!     Thank you for choosing [facility]  for your care. Our goal is always to provide you with excellent care. Hearing back from our patients is one way we can continue to improve our services. Please take a few minutes to complete the written survey that you may receive in the mail after your visit with us. Thank you!             Your Updated Medication List - Protect others around you: Learn how to safely use, store and throw away your medicines at www.disposemymeds.org.          This list is accurate as of 10/17/18  9:55 AM.  Always use your most recent med list.                   Brand Name Dispense Instructions for use Diagnosis    prenatal multivitamin " plus iron 27-0.8 MG Tabs per tablet      Take 1 tablet by mouth daily

## 2018-10-20 LAB
# FETUSES US: NORMAL
# FETUSES: NORMAL
AFP ADJ MOM AMN: 1.19
AFP SERPL-MCNC: 43 NG/ML
AGE - REPORTED: 28.6 YR
CURRENT SMOKER: NO
CURRENT SMOKER: NO
DIABETES STATUS PATIENT: NO
FAMILY MEMBER DISEASES HX: NO
FAMILY MEMBER DISEASES HX: NO
GA METHOD: NORMAL
GA METHOD: NORMAL
GA: NORMAL WK
IDDM PATIENT QL: NO
INTEGRATED SCN PATIENT-IMP: NORMAL
LMP START DATE: NORMAL
MONOCHORIONIC TWINS: NO
SERVICE CMNT-IMP: NO
SPECIMEN DRAWN SERPL: NORMAL
VALPROIC/CARBAMAZEPINE STATUS: NO
WEIGHT UNITS: NORMAL

## 2018-11-13 ENCOUNTER — PRENATAL OFFICE VISIT (OUTPATIENT)
Dept: OBGYN | Facility: OTHER | Age: 28
End: 2018-11-13
Attending: ADVANCED PRACTICE MIDWIFE
Payer: COMMERCIAL

## 2018-11-13 ENCOUNTER — HOSPITAL ENCOUNTER (OUTPATIENT)
Dept: ULTRASOUND IMAGING | Facility: HOSPITAL | Age: 28
Discharge: HOME | End: 2018-11-13
Attending: ADVANCED PRACTICE MIDWIFE | Admitting: ADVANCED PRACTICE MIDWIFE
Payer: COMMERCIAL

## 2018-11-13 ENCOUNTER — HOSPITAL ENCOUNTER (OUTPATIENT)
Dept: ULTRASOUND IMAGING | Facility: CLINIC | Age: 28
End: 2018-11-13
Attending: ADVANCED PRACTICE MIDWIFE
Payer: COMMERCIAL

## 2018-11-13 ENCOUNTER — OFFICE VISIT (OUTPATIENT)
Dept: MATERNAL FETAL MEDICINE | Facility: CLINIC | Age: 28
End: 2018-11-13
Attending: ADVANCED PRACTICE MIDWIFE
Payer: COMMERCIAL

## 2018-11-13 VITALS
BODY MASS INDEX: 20.16 KG/M2 | SYSTOLIC BLOOD PRESSURE: 104 MMHG | WEIGHT: 121 LBS | DIASTOLIC BLOOD PRESSURE: 62 MMHG | HEIGHT: 65 IN

## 2018-11-13 DIAGNOSIS — Z34.82 ENCOUNTER FOR SUPERVISION OF OTHER NORMAL PREGNANCY, SECOND TRIMESTER: Primary | ICD-10-CM

## 2018-11-13 DIAGNOSIS — O28.3 ECHOGENIC INTRACARDIAC FOCUS OF FETUS ON PRENATAL ULTRASOUND: Primary | ICD-10-CM

## 2018-11-13 DIAGNOSIS — Z34.81 ENCOUNTER FOR SUPERVISION OF OTHER NORMAL PREGNANCY IN FIRST TRIMESTER: ICD-10-CM

## 2018-11-13 DIAGNOSIS — Z36.3 SCREENING, ANTENATAL, FOR MALFORMATION BY ULTRASOUND: ICD-10-CM

## 2018-11-13 PROCEDURE — 99207 ZZC PRENATAL VISIT: CPT | Performed by: ADVANCED PRACTICE MIDWIFE

## 2018-11-13 PROCEDURE — 76811 OB US DETAILED SNGL FETUS: CPT | Mod: TC

## 2018-11-13 PROCEDURE — G0463 HOSPITAL OUTPT CLINIC VISIT: HCPCS | Mod: 25

## 2018-11-13 PROCEDURE — G0463 HOSPITAL OUTPT CLINIC VISIT: HCPCS

## 2018-11-13 ASSESSMENT — PAIN SCALES - GENERAL: PAINLEVEL: MODERATE PAIN (4)

## 2018-11-13 NOTE — MR AVS SNAPSHOT
After Visit Summary   2018    Tracey Caban    MRN: [id number]           Patient Information     Date Of Birth          1990        Visit Information        Provider Department      2018 8:30 AM AMERICA THOMAS MD [facility] Maternal Fetal Medicine [facility]        Today's Diagnoses     Echogenic intracardiac focus of fetus on prenatal ultrasound    -  1    Screening, , for malformation by ultrasound           Follow-ups after your visit        Your next 10 appointments already scheduled     2018 10:00 AM CST   (Arrive by 9:45 AM)   ESTABLISHED PRENATAL with TEO Dhillon CNM   [facility] ([facility] )    [address] [address] [phone]              Who to contact     If you have questions or need follow up information about today's clinic visit or your schedule please contact [facility] MATERNAL FETAL MEDICINE [facility] directly at [phone].  Normal or non-critical lab and imaging results will be communicated to you by MyChart, letter or phone within 4 business days after the clinic has received the results. If you do not hear from us within 7 days, please contact the clinic through [employer] or phone. If you have a critical or abnormal lab result, we will notify you by phone as soon as possible.  Submit refill requests through [email or web address] or call your pharmacy and they will forward the refill request to us. Please allow 3 business days for your refill to be completed.          Additional Information About Your Visit        Care EveryWhere ID     This is your Care EveryWhere ID. This could be used by other organizations to access your [place] medical records  [id number]        Your Vitals Were     Last Period                   2018            Blood Pressure from Last 3 Encounters:   10/17/18 98/60   18 104/68   18 118/68    Weight from Last 3 Encounters:   10/17/18  53.5 kg (118 lb)   09/12/18 53.1 kg (117 lb)   12/21/17 51.5 kg (113 lb 9.6 oz)              Today, you had the following     No orders found for display       Primary Care Provider Office Phone # Fax #    Alla Bustamante [phone] [address]  [place] [address]        Equal Access to Services     [facility] : Hadii aad ku hadasho Soomaali, waaxda luqadaha, qaybta kaalmada adeegyada, waxay idiin hayaan adeeg kharash lajorge luisn ah. So [facility] [phone].    ATENCIÓN: Si angel alexander, tiene a whitaker disposición servicios gratuitos de asistencia lingüística. Llame al [phone].    We comply with applicable federal civil rights laws and [place] laws. We do not discriminate on the basis of race, color, national origin, age, disability, sex, sexual orientation, or gender identity.            Thank you!     Thank you for choosing [facility]ERNAL FETAL [facility]IC[facility]  for your care. Our goal is always to provide you with excellent care. Hearing back from our patients is one way we can continue to improve our services. Please take a few minutes to complete the written survey that you may receive in the mail after your visit with us. Thank you!             Your Updated Medication List - Protect others around you: Learn how to safely use, store and throw away your medicines at www.disposemymeds.org.          This list is accurate as of 11/13/18  8:49 AM.  Always use your most recent med list.                   Brand Name Dispense Instructions for use Diagnosis    prenatal multivitamin plus iron 27-0.8 MG Tabs per tablet      Take 1 tablet by mouth daily

## 2018-11-13 NOTE — PROGRESS NOTES
Please refer to ultrasound report under 'Imaging' Studies of 'Chart Review' tabs.    Paul Cabral M.D.      Type of service: Telemedicine Office Visit for ultrasound    Date of service:  Date: 11/13/18     Time service began:  8:00 AM    Time service ended:  9:00 AM    Reason: .tel: Patient unable to travel    Description of basis or telemedicine appropriateness:     Consultation provided at the request of Tom Ramos for advice regarding the diagnosis and treatment of this patient's pregnancy.  The patient's condition can be safely assessed via telemedicine.    The Mode of Transmission:    Secure interactive audio and visual telecommunication system (Video Guidance)    Location of originating and distant sites:      Originating site:   [place], [place]    Distant site:    [place], [place]    Paul Cabral

## 2018-11-13 NOTE — PATIENT INSTRUCTIONS
Return to office in 4 weeks for prenatal care and as needed.    Thank you for allowing Tom BRUCE CNM and our OB team to participate in your care.  If you have a scheduling or an appointment question please contact [facility] Unit Coordinator at their direct line [phone].   ALL nursing questions or concerns can be directed to your OB nurse at: [phone] Federico Roach/Selma

## 2018-11-13 NOTE — NURSING NOTE
"Chief Complaint   Patient presents with     Prenatal Care     19w6d       Initial /62 (BP Location: Left arm, Patient Position: Chair, Cuff Size: Adult Regular)  Ht 5' 5\" (1.651 m)  Wt 121 lb (54.9 kg)  LMP 06/27/2018  BMI 20.14 kg/m2 Estimated body mass index is 20.14 kg/(m^2) as calculated from the following:    Height as of this encounter: 5' 5\" (1.651 m).    Weight as of this encounter: 121 lb (54.9 kg).  Medication Reconciliation: complete    Marley Mckeon LPN    "

## 2018-11-13 NOTE — MR AVS SNAPSHOT
After Visit Summary   11/13/2018    Tracey Caban    MRN: [id number]           Patient Information     Date Of Birth          1990        Visit Information        Provider Department      11/13/2018 10:00 AM Tom Ramos APRN CNM [facility]        Today's Diagnoses     Encounter for supervision of other normal pregnancy, second trimester    -  1      Care Instructions    Return to office in 4 weeks for prenatal care and as needed.    Thank you for allowing Tom BRUCE CNM and our OB team to participate in your care.  If you have a scheduling or an appointment question please contact [facility] Unit Coordinator at their direct line [phone].   ALL nursing questions or concerns can be directed to your OB nurse at: [phone] - Marley/Selma               Follow-ups after your visit        Your next 10 appointments already scheduled     Dec 12, 2018  9:30 AM CST   (Arrive by 9:15 AM)   ESTABLISHED PRENATAL with TEO Dhillon CNM   [facility] )    [address]  [place] [address]   [phone]              Who to contact     If you have questions or need follow up information about today's clinic visit or your schedule please contact [facility] directly at [phone].  Normal or non-critical lab and imaging results will be communicated to you by MyChart, letter or phone within 4 business days after the clinic has received the results. If you do not hear from us within 7 days, please contact the clinic through MyChart or phone. If you have a critical or abnormal lab result, we will notify you by phone as soon as possible.  Submit refill requests through Dmailer or call your pharmacy and they will forward the refill request to us. Please allow 3 business days for your refill to be completed.          Additional Information About Your Visit        Care EveryWhere ID      "This is your Care EveryWhere ID. This could be used by other organizations to access your [place] medical records  [id number]        Your Vitals Were     Height Last Period BMI (Body Mass Index)             5' 5\" (1.651 m) 06/27/2018 20.14 kg/m2          Blood Pressure from Last 3 Encounters:   11/13/18 104/62   10/17/18 98/60   09/12/18 104/68    Weight from Last 3 Encounters:   11/13/18 121 lb (54.9 kg)   10/17/18 118 lb (53.5 kg)   09/12/18 117 lb (53.1 kg)              Today, you had the following     No orders found for display       Primary Care Provider Office Phone # Fax #    Alla Bustamante [phone] [address] [facility] [address]        Equal Access to Services     [facility]: Hadii aad roldan hadasho Soomaali, waaxda luqadaha, qaybta kaalmada adeegyada, gibran gonzalez haycorina william . So [facility] [phone].    ATENCIÓN: Si habla español, tiene a whitaker disposición servicios gratuitos de asistencia lingüística. Llame al [phone].    We comply with applicable federal civil rights laws and [place] laws. We do not discriminate on the basis of race, color, national origin, age, disability, sex, sexual orientation, or gender identity.            Thank you!     Thank you for choosing [facility]  for your care. Our goal is always to provide you with excellent care. Hearing back from our patients is one way we can continue to improve our services. Please take a few minutes to complete the written survey that you may receive in the mail after your visit with us. Thank you!             Your Updated Medication List - Protect others around you: Learn how to safely use, store and throw away your medicines at www.disposemymeds.org.          This list is accurate as of 11/13/18 12:03 PM.  Always use your most recent med list.                   Brand Name Dispense Instructions for use Diagnosis    prenatal multivitamin plus iron 27-0.8 MG Tabs " per tablet      Take 1 tablet by mouth daily

## 2018-12-12 ENCOUNTER — PRENATAL OFFICE VISIT (OUTPATIENT)
Dept: OBGYN | Facility: OTHER | Age: 28
End: 2018-12-12
Attending: ADVANCED PRACTICE MIDWIFE

## 2018-12-12 VITALS — WEIGHT: 125 LBS | SYSTOLIC BLOOD PRESSURE: 106 MMHG | DIASTOLIC BLOOD PRESSURE: 70 MMHG | BODY MASS INDEX: 20.8 KG/M2

## 2018-12-12 DIAGNOSIS — Z34.82 ENCOUNTER FOR SUPERVISION OF OTHER NORMAL PREGNANCY, SECOND TRIMESTER: Primary | ICD-10-CM

## 2018-12-12 PROCEDURE — 99207 ZZC PRENATAL VISIT: CPT | Performed by: ADVANCED PRACTICE MIDWIFE

## 2018-12-12 ASSESSMENT — ANXIETY QUESTIONNAIRES
7. FEELING AFRAID AS IF SOMETHING AWFUL MIGHT HAPPEN: NOT AT ALL
3. WORRYING TOO MUCH ABOUT DIFFERENT THINGS: NOT AT ALL
2. NOT BEING ABLE TO STOP OR CONTROL WORRYING: NOT AT ALL
GAD7 TOTAL SCORE: 0
6. BECOMING EASILY ANNOYED OR IRRITABLE: NOT AT ALL
4. TROUBLE RELAXING: NOT AT ALL
1. FEELING NERVOUS, ANXIOUS, OR ON EDGE: NOT AT ALL
5. BEING SO RESTLESS THAT IT IS HARD TO SIT STILL: NOT AT ALL

## 2018-12-12 ASSESSMENT — PAIN SCALES - GENERAL: PAINLEVEL: NO PAIN (0)

## 2018-12-12 ASSESSMENT — PATIENT HEALTH QUESTIONNAIRE - PHQ9: SUM OF ALL RESPONSES TO PHQ QUESTIONS 1-9: 0

## 2018-12-12 NOTE — PROGRESS NOTES
Doing well.  Baby active.   Denies contractions, bleeding, or leakage of fluid. Increased discharge    Discussed:  Difference with mucus and amniotic fluid, fetal movement, 3rd labs and UA, 1 hr GTT, and Tdap, lab first next visit    Plan:  Next visit:   3rd  Tri labs and UA   1 hour GTT   Tdap   Anticipatory guidance for     Return to office in 4 weeks for prenatal care and as needed.    Tom Ramos, TEO, CNM

## 2018-12-12 NOTE — PATIENT INSTRUCTIONS
Return to office in 4 weeks for prenatal care and as needed.    Thank you for allowing Tom BRUCE CNM and our OB team to participate in your care.  If you have a scheduling or an appointment question please contact [facility] Unit Coordinator at their direct line [phone].   ALL nursing questions or concerns can be directed to your OB nurse at: [phone] Federico Roach/Selma

## 2018-12-13 ASSESSMENT — ANXIETY QUESTIONNAIRES: GAD7 TOTAL SCORE: 0

## 2019-01-09 ENCOUNTER — PRENATAL OFFICE VISIT (OUTPATIENT)
Dept: OBGYN | Facility: OTHER | Age: 29
End: 2019-01-09
Attending: ADVANCED PRACTICE MIDWIFE
Payer: COMMERCIAL

## 2019-01-09 VITALS — SYSTOLIC BLOOD PRESSURE: 90 MMHG | DIASTOLIC BLOOD PRESSURE: 60 MMHG | BODY MASS INDEX: 21.3 KG/M2 | WEIGHT: 128 LBS

## 2019-01-09 DIAGNOSIS — Z34.83 ENCOUNTER FOR SUPERVISION OF OTHER NORMAL PREGNANCY, THIRD TRIMESTER: Primary | ICD-10-CM

## 2019-01-09 DIAGNOSIS — Z23 NEED FOR VACCINATION: ICD-10-CM

## 2019-01-09 DIAGNOSIS — Z34.83 ENCOUNTER FOR SUPERVISION OF OTHER NORMAL PREGNANCY IN THIRD TRIMESTER: ICD-10-CM

## 2019-01-09 DIAGNOSIS — Z34.82 ENCOUNTER FOR SUPERVISION OF OTHER NORMAL PREGNANCY, SECOND TRIMESTER: ICD-10-CM

## 2019-01-09 LAB
ALBUMIN UR-MCNC: NEGATIVE MG/DL
APPEARANCE UR: ABNORMAL
BACTERIA #/AREA URNS HPF: ABNORMAL /HPF
BILIRUB UR QL STRIP: NEGATIVE
COLOR UR AUTO: ABNORMAL
ERYTHROCYTE [DISTWIDTH] IN BLOOD BY AUTOMATED COUNT: 13.2 % (ref 10–15)
GLUCOSE 1H P 50 G GLC PO SERPL-MCNC: 127 MG/DL (ref 60–129)
GLUCOSE UR STRIP-MCNC: NEGATIVE MG/DL
HCT VFR BLD AUTO: 36.7 % (ref 35–47)
HGB BLD-MCNC: 12.4 G/DL (ref 11.7–15.7)
HGB UR QL STRIP: NEGATIVE
KETONES UR STRIP-MCNC: NEGATIVE MG/DL
LEUKOCYTE ESTERASE UR QL STRIP: ABNORMAL
MCH RBC QN AUTO: 29.2 PG (ref 26.5–33)
MCHC RBC AUTO-ENTMCNC: 33.8 G/DL (ref 31.5–36.5)
MCV RBC AUTO: 86 FL (ref 78–100)
MUCOUS THREADS #/AREA URNS LPF: PRESENT /LPF
NITRATE UR QL: NEGATIVE
PH UR STRIP: 7 PH (ref 4.7–8)
PLATELET # BLD AUTO: 253 10E9/L (ref 150–450)
RBC # BLD AUTO: 4.25 10E12/L (ref 3.8–5.2)
RBC #/AREA URNS AUTO: 1 /HPF (ref 0–2)
SOURCE: ABNORMAL
SP GR UR STRIP: 1.01 (ref 1–1.03)
SQUAMOUS #/AREA URNS AUTO: 11 /HPF (ref 0–1)
UROBILINOGEN UR STRIP-MCNC: NORMAL MG/DL (ref 0–2)
WBC # BLD AUTO: 10.1 10E9/L (ref 4–11)
WBC #/AREA URNS AUTO: 3 /HPF (ref 0–5)

## 2019-01-09 PROCEDURE — 90715 TDAP VACCINE 7 YRS/> IM: CPT

## 2019-01-09 PROCEDURE — 85027 COMPLETE CBC AUTOMATED: CPT | Mod: ZL | Performed by: ADVANCED PRACTICE MIDWIFE

## 2019-01-09 PROCEDURE — G0463 HOSPITAL OUTPT CLINIC VISIT: HCPCS | Mod: 25

## 2019-01-09 PROCEDURE — 86780 TREPONEMA PALLIDUM: CPT | Mod: ZL | Performed by: ADVANCED PRACTICE MIDWIFE

## 2019-01-09 PROCEDURE — 82950 GLUCOSE TEST: CPT | Performed by: ADVANCED PRACTICE MIDWIFE

## 2019-01-09 PROCEDURE — 36415 COLL VENOUS BLD VENIPUNCTURE: CPT | Mod: ZL | Performed by: ADVANCED PRACTICE MIDWIFE

## 2019-01-09 PROCEDURE — 82950 GLUCOSE TEST: CPT | Mod: ZL | Performed by: ADVANCED PRACTICE MIDWIFE

## 2019-01-09 PROCEDURE — G0463 HOSPITAL OUTPT CLINIC VISIT: HCPCS

## 2019-01-09 PROCEDURE — 90715 TDAP VACCINE 7 YRS/> IM: CPT | Performed by: ADVANCED PRACTICE MIDWIFE

## 2019-01-09 PROCEDURE — 99000 SPECIMEN HANDLING OFFICE-LAB: CPT | Performed by: ADVANCED PRACTICE MIDWIFE

## 2019-01-09 PROCEDURE — 86780 TREPONEMA PALLIDUM: CPT | Performed by: ADVANCED PRACTICE MIDWIFE

## 2019-01-09 PROCEDURE — 85027 COMPLETE CBC AUTOMATED: CPT | Performed by: ADVANCED PRACTICE MIDWIFE

## 2019-01-09 PROCEDURE — 81001 URINALYSIS AUTO W/SCOPE: CPT | Performed by: ADVANCED PRACTICE MIDWIFE

## 2019-01-09 PROCEDURE — 81001 URINALYSIS AUTO W/SCOPE: CPT | Mod: ZL | Performed by: ADVANCED PRACTICE MIDWIFE

## 2019-01-09 PROCEDURE — 90471 IMMUNIZATION ADMIN: CPT | Performed by: ADVANCED PRACTICE MIDWIFE

## 2019-01-09 PROCEDURE — 36415 COLL VENOUS BLD VENIPUNCTURE: CPT | Performed by: ADVANCED PRACTICE MIDWIFE

## 2019-01-09 PROCEDURE — 99207 ZZC PRENATAL VISIT: CPT | Performed by: ADVANCED PRACTICE MIDWIFE

## 2019-01-09 ASSESSMENT — PAIN SCALES - GENERAL: PAINLEVEL: NO PAIN (0)

## 2019-01-09 NOTE — PATIENT INSTRUCTIONS
Return to office in 2 weeks for prenatal care and as needed.    Thank you for allowing Tom BRUCE CNM and our OB team to participate in your care.  If you have a scheduling or an appointment question please contact [facility] Unit Coordinator at their direct line [phone].   ALL nursing questions or concerns can be directed to your OB nurse at: [phone] Federico Roach/Selma

## 2019-01-09 NOTE — PROGRESS NOTES
Doing well.  Baby active.   Denies contractions, bleeding, or leakage of fluid.     Discussed:  PTL, PIH, kick count, Tdap, GTT    Plan:  3rd tri labs, UA, 1 hr GTT  Tdap  Next visit:     Anticipatory [place] Care    Return to office in 2 weeks for prenatal care and as needed.    TEO Morrissey, CNM

## 2019-01-10 LAB — T PALLIDUM AB SER QL: NONREACTIVE

## 2019-01-23 ENCOUNTER — PRENATAL OFFICE VISIT (OUTPATIENT)
Dept: OBGYN | Facility: OTHER | Age: 29
End: 2019-01-23
Attending: ADVANCED PRACTICE MIDWIFE
Payer: COMMERCIAL

## 2019-01-23 VITALS — SYSTOLIC BLOOD PRESSURE: 90 MMHG | BODY MASS INDEX: 21.47 KG/M2 | WEIGHT: 129 LBS | DIASTOLIC BLOOD PRESSURE: 56 MMHG

## 2019-01-23 DIAGNOSIS — Z34.83 ENCOUNTER FOR SUPERVISION OF OTHER NORMAL PREGNANCY, THIRD TRIMESTER: ICD-10-CM

## 2019-01-23 DIAGNOSIS — K59.01 SLOW TRANSIT CONSTIPATION: Primary | ICD-10-CM

## 2019-01-23 PROCEDURE — 99207 ZZC PRENATAL VISIT: CPT | Performed by: ADVANCED PRACTICE MIDWIFE

## 2019-01-23 PROCEDURE — G0463 HOSPITAL OUTPT CLINIC VISIT: HCPCS

## 2019-01-23 RX ORDER — DOCUSATE SODIUM 100 MG/1
100 CAPSULE, LIQUID FILLED ORAL 4 TIMES DAILY PRN
Qty: 120 CAPSULE | Refills: 1 | Status: SHIPPED | OUTPATIENT
Start: 2019-01-23 | End: 2019-09-16

## 2019-01-23 ASSESSMENT — PAIN SCALES - GENERAL: PAINLEVEL: MILD PAIN (2)

## 2019-01-23 NOTE — PROGRESS NOTES
Doing well.  Baby active.   Denies contractions, bleeding, or leakage of fluid.     Discussed:  Constipation, fetal presentation, kick count  Anticipatory Guidance [place] care in hospital  Respiratory therapy called for all deliveries  Skin to skin  Immunizations/meds   -Hepatitis B   -Erythromycin   -Vitamin K  Screening   -Hearing   -CCHD   -Bilirubin   -Metabolic  Rooming in  Feeding:  Breast  Car seats  Provider/appointments     Plan:  GGT @ 34 weeks for late onset  EFW @ 38 weeks    Return to office in 2 weeks for prenatal care and as needed.    TEO Morrissey, CNM

## 2019-01-23 NOTE — PATIENT INSTRUCTIONS
Return to office in 2 weeks for prenatal care and as needed.    Thank you for allowing Tom BRUCE CNM and our OB team to participate in your care.  If you have a scheduling or an appointment question please contact [facility] Unit Coordinator at their direct line [phone].   ALL nursing questions or concerns can be directed to your OB nurse at: [phone] Federico Roach/Selma

## 2019-02-11 ENCOUNTER — PRENATAL OFFICE VISIT (OUTPATIENT)
Dept: OBGYN | Facility: OTHER | Age: 29
End: 2019-02-11
Attending: ADVANCED PRACTICE MIDWIFE
Payer: COMMERCIAL

## 2019-02-11 VITALS
WEIGHT: 132 LBS | HEIGHT: 65 IN | BODY MASS INDEX: 21.99 KG/M2 | SYSTOLIC BLOOD PRESSURE: 92 MMHG | DIASTOLIC BLOOD PRESSURE: 54 MMHG

## 2019-02-11 DIAGNOSIS — Z34.83 ENCOUNTER FOR SUPERVISION OF OTHER NORMAL PREGNANCY, THIRD TRIMESTER: Primary | ICD-10-CM

## 2019-02-11 PROCEDURE — 99207 ZZC PRENATAL VISIT: CPT | Performed by: ADVANCED PRACTICE MIDWIFE

## 2019-02-11 PROCEDURE — G0463 HOSPITAL OUTPT CLINIC VISIT: HCPCS

## 2019-02-11 ASSESSMENT — MIFFLIN-ST. JEOR: SCORE: 1329.63

## 2019-02-11 ASSESSMENT — PAIN SCALES - GENERAL: PAINLEVEL: MODERATE PAIN (5)

## 2019-02-11 NOTE — PATIENT INSTRUCTIONS
Return to office in 2 weeks for prenatal care and as needed.    Thank you for allowing Tom BRUCE CNM and our OB team to participate in your care.  If you have a scheduling or an appointment question please contact [facility] Unit Coordinator at their direct line [phone].   ALL nursing questions or concerns can be directed to your OB nurse at: [phone] Federico Roach/Selma

## 2019-02-11 NOTE — NURSING NOTE
"Chief Complaint   Patient presents with     Prenatal Care     32w5d       Initial BP 92/54 (BP Location: Left arm, Patient Position: Chair, Cuff Size: Adult Regular)   Ht 1.651 m (5' 5\")   Wt 59.9 kg (132 lb)   LMP 06/27/2018   BMI 21.97 kg/m   Estimated body mass index is 21.97 kg/m  as calculated from the following:    Height as of this encounter: 1.651 m (5' 5\").    Weight as of this encounter: 59.9 kg (132 lb).  Medication Reconciliation: complete    Marley Mckeon LPN    "

## 2019-02-11 NOTE — PROGRESS NOTES
Doing well.  Baby active.   Denies contractions, bleeding, or leakage of fluid.     Discussed:  Uterine, muscles and skin stretching, Changes in areola color, constipation improved with Colace, fetal movement, kick count; EFW @ 38 weeks.  Appropriate weight gain, shared decision not to repeat 1 hr GTT.    Plan:  GBS PCR @ 36 weeeks    Return to office in 2 weeks for prenatal care and as needed.    TEO Morrissey, CNM

## 2019-02-14 DIAGNOSIS — K21.00 GASTROESOPHAGEAL REFLUX DISEASE WITH ESOPHAGITIS: Primary | ICD-10-CM

## 2019-02-14 NOTE — TELEPHONE ENCOUNTER
Zantac - patient called wanting refill.   Patient reported medication.   Last visit: 2.11.19    Future appointments:   Next 5 appointments (look out 90 days)    Feb 25, 2019  9:30 AM CST  (Arrive by 9:15 AM)  ESTABLISHED PRENATAL with TEO Dhillon CNM  [facility] ([facility] ) [address]  [place] [id number] [phone]          Patient's phone: [phone]

## 2019-02-25 ENCOUNTER — PRENATAL OFFICE VISIT (OUTPATIENT)
Dept: OBGYN | Facility: OTHER | Age: 29
End: 2019-02-25
Attending: ADVANCED PRACTICE MIDWIFE
Payer: COMMERCIAL

## 2019-02-25 VITALS — SYSTOLIC BLOOD PRESSURE: 100 MMHG | WEIGHT: 132 LBS | DIASTOLIC BLOOD PRESSURE: 62 MMHG | BODY MASS INDEX: 21.97 KG/M2

## 2019-02-25 DIAGNOSIS — N89.8 VAGINAL DISCHARGE: Primary | ICD-10-CM

## 2019-02-25 DIAGNOSIS — N89.8 VAGINAL ITCHING: ICD-10-CM

## 2019-02-25 PROCEDURE — G0463 HOSPITAL OUTPT CLINIC VISIT: HCPCS

## 2019-02-25 PROCEDURE — 99207 ZZC PRENATAL VISIT: CPT | Performed by: ADVANCED PRACTICE MIDWIFE

## 2019-02-25 ASSESSMENT — PAIN SCALES - GENERAL: PAINLEVEL: NO PAIN (0)

## 2019-02-25 NOTE — PATIENT INSTRUCTIONS
Return to office in 1-2 weeks for prenatal care and as needed.    Thank you for allowing Tom BRUCE CNM and our OB team to participate in your care.  If you have a scheduling or an appointment question please contact [facility] Unit Coordinator at their direct line [phone].   ALL nursing questions or concerns can be directed to your OB nurse at: [phone] Federico Roach/Selma

## 2019-02-25 NOTE — PROGRESS NOTES
Doing well.  Baby active.   Denies contractions, bleeding, or leakage of fluid.  Increased discharge.    Discussed:  PTL, GBS screening, US for fluid check and presentation, kick count    Pt reports Vaginal discharge, itching, and irritation.     Plan:  Wet prep  Next visit:   GBS PCR   US for presentation and fluid check    Return to office in 1-2 weeks for prenatal care and as needed.    TEO Morrissey, CNM

## 2019-03-06 ENCOUNTER — PRENATAL OFFICE VISIT (OUTPATIENT)
Dept: OBGYN | Facility: OTHER | Age: 29
End: 2019-03-06
Attending: ADVANCED PRACTICE MIDWIFE
Payer: COMMERCIAL

## 2019-03-06 VITALS
DIASTOLIC BLOOD PRESSURE: 56 MMHG | WEIGHT: 134 LBS | HEIGHT: 65 IN | BODY MASS INDEX: 22.33 KG/M2 | SYSTOLIC BLOOD PRESSURE: 94 MMHG

## 2019-03-06 DIAGNOSIS — Z34.83 ENCOUNTER FOR SUPERVISION OF OTHER NORMAL PREGNANCY IN THIRD TRIMESTER: ICD-10-CM

## 2019-03-06 DIAGNOSIS — Z34.83 ENCOUNTER FOR SUPERVISION OF OTHER NORMAL PREGNANCY, THIRD TRIMESTER: Primary | ICD-10-CM

## 2019-03-06 PROCEDURE — G0463 HOSPITAL OUTPT CLINIC VISIT: HCPCS | Mod: 25

## 2019-03-06 PROCEDURE — 87653 STREP B DNA AMP PROBE: CPT | Performed by: ADVANCED PRACTICE MIDWIFE

## 2019-03-06 PROCEDURE — 99207 ZZC PRENATAL VISIT: CPT | Mod: 25 | Performed by: ADVANCED PRACTICE MIDWIFE

## 2019-03-06 PROCEDURE — G0463 HOSPITAL OUTPT CLINIC VISIT: HCPCS

## 2019-03-06 PROCEDURE — 87653 STREP B DNA AMP PROBE: CPT | Mod: ZL | Performed by: ADVANCED PRACTICE MIDWIFE

## 2019-03-06 PROCEDURE — 76815 OB US LIMITED FETUS(S): CPT | Mod: TC | Performed by: ADVANCED PRACTICE MIDWIFE

## 2019-03-06 ASSESSMENT — MIFFLIN-ST. JEOR: SCORE: 1338.7

## 2019-03-06 ASSESSMENT — PAIN SCALES - GENERAL: PAINLEVEL: MILD PAIN (3)

## 2019-03-06 NOTE — PATIENT INSTRUCTIONS
Return to office in 1 week for prenatal care and as needed.    Thank you for allowing Tom BRUCE CNM and our OB team to participate in your care.  If you have a scheduling or an appointment question please contact [facility] Unit Coordinator at their direct line [phone].   ALL nursing questions or concerns can be directed to your OB nurse at: [phone] Federico Roach/Selma

## 2019-03-06 NOTE — NURSING NOTE
"Chief Complaint   Patient presents with     Prenatal Care     36w0d       Initial BP 94/56 (BP Location: Left arm, Patient Position: Chair, Cuff Size: Adult Regular)   Ht 1.651 m (5' 5\")   Wt 60.8 kg (134 lb)   LMP 06/27/2018   BMI 22.30 kg/m   Estimated body mass index is 22.3 kg/m  as calculated from the following:    Height as of this encounter: 1.651 m (5' 5\").    Weight as of this encounter: 60.8 kg (134 lb).  Medication Reconciliation: complete    Marley Mckeon LPN    "

## 2019-03-06 NOTE — PROGRESS NOTES
Doing well.  Baby active.   Denies leakage of fluid. Some contractions and pressure; small amount of light pink spotting x 1    Discussed:  Labor, GBS, when to go to hospital, breech presentation, kick count    US:  BREECH.  Single vertical pocket > 2 cm     Plan:  GBS PCR  US for position and fluid check  Schedule appt with OB for external version consult    Return to office in 1 weeks for prenatal care and as needed.    Tom Ramos, TEO, CNM

## 2019-03-07 LAB
GP B STREP DNA SPEC QL NAA+PROBE: POSITIVE
SPECIMEN SOURCE: ABNORMAL

## 2019-03-11 ENCOUNTER — PRENATAL OFFICE VISIT (OUTPATIENT)
Dept: OBGYN | Facility: OTHER | Age: 29
End: 2019-03-11
Attending: ADVANCED PRACTICE MIDWIFE
Payer: COMMERCIAL

## 2019-03-11 VITALS
WEIGHT: 134.5 LBS | DIASTOLIC BLOOD PRESSURE: 76 MMHG | SYSTOLIC BLOOD PRESSURE: 110 MMHG | BODY MASS INDEX: 22.38 KG/M2 | HEART RATE: 77 BPM

## 2019-03-11 DIAGNOSIS — Z34.83 ENCOUNTER FOR SUPERVISION OF OTHER NORMAL PREGNANCY IN THIRD TRIMESTER: ICD-10-CM

## 2019-03-11 DIAGNOSIS — Z34.03 SUPERVISION OF NORMAL FIRST PREGNANCY IN THIRD TRIMESTER: Primary | ICD-10-CM

## 2019-03-11 PROCEDURE — 76815 OB US LIMITED FETUS(S): CPT | Mod: TC | Performed by: ADVANCED PRACTICE MIDWIFE

## 2019-03-11 PROCEDURE — 99207 ZZC PRENATAL VISIT: CPT | Mod: 25 | Performed by: ADVANCED PRACTICE MIDWIFE

## 2019-03-11 PROCEDURE — G0463 HOSPITAL OUTPT CLINIC VISIT: HCPCS | Mod: 25

## 2019-03-11 PROCEDURE — G0463 HOSPITAL OUTPT CLINIC VISIT: HCPCS

## 2019-03-11 NOTE — NURSING NOTE
"Chief Complaint   Patient presents with     Prenatal Care       Initial /76   Pulse 77   Wt 61 kg (134 lb 8 oz)   LMP 06/27/2018   BMI 22.38 kg/m   Estimated body mass index is 22.38 kg/m  as calculated from the following:    Height as of 3/6/19: 1.651 m (5' 5\").    Weight as of this encounter: 61 kg (134 lb 8 oz).  Medication Reconciliation: complete    MILEY LOUIS LPN    "

## 2019-03-11 NOTE — PROGRESS NOTES
"Doing well.  Baby active.   Denies contractions, bleeding, or leakage of fluid. Some contractions    Discussed:  Breech to cephalic presentation, OB Consult, kick count    Pt reports seeing a chiropractor and doing exercises.  Reports \"feeling the baby flip\"     US:  Cephalic.      Plan:  US for presentation and fluid check  Cancel OB consult on 3/14/19  Notiifed WHBC of Unstable lie  EFW and presentation @ 38 weeks    Return to office in 1 weeks for prenatal care and as needed.    Tom Ramos, APRN, CNM  "

## 2019-03-11 NOTE — PATIENT INSTRUCTIONS
Return to clinic in 1 week.  If you think your bag of water is broken; have bleeding like a period; think you are in labor; or are worried about your baby's movement, please call the labor and delivery unit at [phone].    Thank you for allowing Tom BRUCE CNM and our OB team to participate in your care.  If you have a scheduling or an appointment question please contact [facility] Unit Coordinator at their direct line [phone].   ALL nursing questions or concerns can be directed to your OB nurse at: [phone] Federico Roach/Selma

## 2019-03-18 ENCOUNTER — PRENATAL OFFICE VISIT (OUTPATIENT)
Dept: OBGYN | Facility: OTHER | Age: 29
End: 2019-03-18
Attending: ADVANCED PRACTICE MIDWIFE
Payer: COMMERCIAL

## 2019-03-18 VITALS — DIASTOLIC BLOOD PRESSURE: 68 MMHG | BODY MASS INDEX: 21.97 KG/M2 | WEIGHT: 132 LBS | SYSTOLIC BLOOD PRESSURE: 98 MMHG

## 2019-03-18 DIAGNOSIS — B96.89 BV (BACTERIAL VAGINOSIS): ICD-10-CM

## 2019-03-18 DIAGNOSIS — Z34.83 ENCOUNTER FOR SUPERVISION OF OTHER NORMAL PREGNANCY, THIRD TRIMESTER: Primary | ICD-10-CM

## 2019-03-18 DIAGNOSIS — N89.8 VAGINAL DISCHARGE: ICD-10-CM

## 2019-03-18 DIAGNOSIS — N76.0 BV (BACTERIAL VAGINOSIS): ICD-10-CM

## 2019-03-18 LAB
SPECIMEN SOURCE: ABNORMAL
WET PREP SPEC: ABNORMAL

## 2019-03-18 PROCEDURE — G0463 HOSPITAL OUTPT CLINIC VISIT: HCPCS

## 2019-03-18 PROCEDURE — 76815 OB US LIMITED FETUS(S): CPT | Mod: TC | Performed by: ADVANCED PRACTICE MIDWIFE

## 2019-03-18 PROCEDURE — 87210 SMEAR WET MOUNT SALINE/INK: CPT | Mod: ZL | Performed by: ADVANCED PRACTICE MIDWIFE

## 2019-03-18 PROCEDURE — 99207 ZZC PRENATAL VISIT: CPT | Mod: 25 | Performed by: ADVANCED PRACTICE MIDWIFE

## 2019-03-18 PROCEDURE — G0463 HOSPITAL OUTPT CLINIC VISIT: HCPCS | Mod: 25

## 2019-03-18 RX ORDER — METRONIDAZOLE 500 MG/1
2000 TABLET ORAL ONCE
Qty: 4 TABLET | Refills: 0 | Status: SHIPPED | OUTPATIENT
Start: 2019-03-18 | End: 2019-03-25

## 2019-03-18 ASSESSMENT — PAIN SCALES - GENERAL: PAINLEVEL: NO PAIN (0)

## 2019-03-18 NOTE — PROGRESS NOTES
Doing well.  Baby active.   Denies bleeding, or leakage of fluid. Some mild cramping    Discussed:  Fetal presentation, EFW, cramping and pressure, some vaginal itching, kick count    US:  Cephalic    Plan:  Wet prep  EFW @ 38 weeks    Return to office in 1 weeks for prenatal care and as needed.    TEO Morrissey, CNM

## 2019-03-18 NOTE — PATIENT INSTRUCTIONS
Return to office in 1 week(s) for prenatal care and as needed.    If you think your bag of water is broke; have bleeding like a period; think your in labor; or are worried about your baby's movement; please call the labor and delivery unit @ [phone].    Thank you for allowing Tom BRUCE CNM and our OB team to participate in your care.  If you have a scheduling or an appointment question please contact [facility] Unit Coordinator at their direct line [phone].   ALL nursing questions or concerns can be directed to your OB nurse at: [phone] Federico Roach/Selma

## 2019-03-22 ENCOUNTER — HOSPITAL ENCOUNTER (OUTPATIENT)
Dept: ULTRASOUND IMAGING | Facility: HOSPITAL | Age: 29
Discharge: HOME | End: 2019-03-22
Attending: ADVANCED PRACTICE MIDWIFE | Admitting: ADVANCED PRACTICE MIDWIFE
Payer: COMMERCIAL

## 2019-03-22 DIAGNOSIS — Z34.83 ENCOUNTER FOR SUPERVISION OF OTHER NORMAL PREGNANCY, THIRD TRIMESTER: ICD-10-CM

## 2019-03-22 PROCEDURE — 76816 OB US FOLLOW-UP PER FETUS: CPT | Mod: TC

## 2019-03-25 ENCOUNTER — PRENATAL OFFICE VISIT (OUTPATIENT)
Dept: OBGYN | Facility: OTHER | Age: 29
End: 2019-03-25
Attending: ADVANCED PRACTICE MIDWIFE
Payer: COMMERCIAL

## 2019-03-25 VITALS — WEIGHT: 134 LBS | BODY MASS INDEX: 22.3 KG/M2 | DIASTOLIC BLOOD PRESSURE: 68 MMHG | SYSTOLIC BLOOD PRESSURE: 100 MMHG

## 2019-03-25 DIAGNOSIS — Z34.83 ENCOUNTER FOR SUPERVISION OF OTHER NORMAL PREGNANCY, THIRD TRIMESTER: Primary | ICD-10-CM

## 2019-03-25 DIAGNOSIS — Z34.83 ENCOUNTER FOR SUPERVISION OF OTHER NORMAL PREGNANCY IN THIRD TRIMESTER: ICD-10-CM

## 2019-03-25 PROCEDURE — 99207 ZZC PRENATAL VISIT: CPT | Performed by: ADVANCED PRACTICE MIDWIFE

## 2019-03-25 PROCEDURE — G0463 HOSPITAL OUTPT CLINIC VISIT: HCPCS

## 2019-03-25 ASSESSMENT — PAIN SCALES - GENERAL: PAINLEVEL: MODERATE PAIN (4)

## 2019-03-25 NOTE — PATIENT INSTRUCTIONS
Return to office in 1 week(s) for prenatal care and as needed.    If you think your bag of water is broke; have bleeding like a period; think your in labor; or are worried about your baby's movement; please call the labor and delivery unit @ [phone].    Thank you for allowing Tom BRUCE CNM and our OB team to participate in your care.  If you have a scheduling or an appointment question please contact [facility] Unit Coordinator at their direct line [phone].   ALL nursing questions or concerns can be directed to your OB nurse at: [phone] Federico Roach/Selma

## 2019-03-25 NOTE — PROGRESS NOTES
Doing well.  Baby active.   Denies bleeding, or leakage of fluid. Contractions every day    Discussed:  Labor, when to go to hospital, IOL @ 41 weeks if not delivered, kick count    Plan:  Treat GBS in labor    Return to office in 1 weeks for prenatal care and as needed.    Tom Ramos, TEO, CNM

## 2019-03-26 ENCOUNTER — HOSPITAL ENCOUNTER (INPATIENT)
Facility: HOSPITAL | Age: 29
LOS: 1 days | Discharge: HOME | End: 2019-03-27
Attending: ADVANCED PRACTICE MIDWIFE | Admitting: ADVANCED PRACTICE MIDWIFE
Payer: COMMERCIAL

## 2019-03-26 ENCOUNTER — ANESTHESIA (OUTPATIENT)
Dept: OBGYN | Facility: HOSPITAL | Age: 29
End: 2019-03-26
Payer: COMMERCIAL

## 2019-03-26 ENCOUNTER — ANESTHESIA EVENT (OUTPATIENT)
Dept: OBGYN | Facility: HOSPITAL | Age: 29
End: 2019-03-26
Payer: COMMERCIAL

## 2019-03-26 DIAGNOSIS — D62 ACUTE BLOOD LOSS ANEMIA: Primary | ICD-10-CM

## 2019-03-26 DIAGNOSIS — K59.09 OTHER CONSTIPATION: ICD-10-CM

## 2019-03-26 PROBLEM — Z37.9 NORMAL LABOR: Status: ACTIVE | Noted: 2019-03-26

## 2019-03-26 PROBLEM — Z37.9 NORMAL LABOR: Status: RESOLVED | Noted: 2019-03-26 | Resolved: 2019-03-26

## 2019-03-26 PROBLEM — Z36.89 ENCOUNTER FOR TRIAGE IN PREGNANT PATIENT: Status: ACTIVE | Noted: 2019-03-26

## 2019-03-26 LAB
ABO + RH BLD: NORMAL
ABO + RH BLD: NORMAL
BLD GP AB SCN SERPL QL: NORMAL
BLOOD BANK CMNT PATIENT-IMP: NORMAL
HGB BLD-MCNC: 13.1 G/DL (ref 11.7–15.7)
PLATELET # BLD AUTO: 163 10E9/L (ref 150–450)
SPECIMEN EXP DATE BLD: NORMAL

## 2019-03-26 PROCEDURE — 0KQM0ZZ REPAIR PERINEUM MUSCLE, OPEN APPROACH: ICD-10-PCS | Performed by: ADVANCED PRACTICE MIDWIFE

## 2019-03-26 PROCEDURE — 59400 OBSTETRICAL CARE: CPT | Performed by: ADVANCED PRACTICE MIDWIFE

## 2019-03-26 PROCEDURE — 25000125 ZZHC RX 250: Performed by: NURSE ANESTHETIST, CERTIFIED REGISTERED

## 2019-03-26 PROCEDURE — 25000128 H RX IP 250 OP 636: Performed by: ADVANCED PRACTICE MIDWIFE

## 2019-03-26 PROCEDURE — 72200001 ZZH LABOR CARE VAGINAL DELIVERY SINGLE

## 2019-03-26 PROCEDURE — 12000000 ZZH R&B MED SURG/OB

## 2019-03-26 PROCEDURE — 25000125 ZZHC RX 250: Performed by: ADVANCED PRACTICE MIDWIFE

## 2019-03-26 PROCEDURE — 25800030 ZZH RX IP 258 OP 636: Performed by: ADVANCED PRACTICE MIDWIFE

## 2019-03-26 PROCEDURE — 86900 BLOOD TYPING SEROLOGIC ABO: CPT | Performed by: ADVANCED PRACTICE MIDWIFE

## 2019-03-26 PROCEDURE — 85049 AUTOMATED PLATELET COUNT: CPT | Performed by: ADVANCED PRACTICE MIDWIFE

## 2019-03-26 PROCEDURE — 00HU33Z INSERTION OF INFUSION DEVICE INTO SPINAL CANAL, PERCUTANEOUS APPROACH: ICD-10-PCS | Performed by: NURSE ANESTHETIST, CERTIFIED REGISTERED

## 2019-03-26 PROCEDURE — 3E0R3BZ INTRODUCTION OF ANESTHETIC AGENT INTO SPINAL CANAL, PERCUTANEOUS APPROACH: ICD-10-PCS | Performed by: NURSE ANESTHETIST, CERTIFIED REGISTERED

## 2019-03-26 PROCEDURE — 59025 FETAL NON-STRESS TEST: CPT

## 2019-03-26 PROCEDURE — 36415 COLL VENOUS BLD VENIPUNCTURE: CPT | Performed by: ADVANCED PRACTICE MIDWIFE

## 2019-03-26 PROCEDURE — 37000011 ZZH ANESTHESIA WARD SERVICE: Performed by: NURSE ANESTHETIST, CERTIFIED REGISTERED

## 2019-03-26 PROCEDURE — G0463 HOSPITAL OUTPT CLINIC VISIT: HCPCS | Mod: 25

## 2019-03-26 PROCEDURE — 86850 RBC ANTIBODY SCREEN: CPT | Performed by: ADVANCED PRACTICE MIDWIFE

## 2019-03-26 PROCEDURE — 25800030 ZZH RX IP 258 OP 636: Performed by: NURSE ANESTHETIST, CERTIFIED REGISTERED

## 2019-03-26 PROCEDURE — 25000132 ZZH RX MED GY IP 250 OP 250 PS 637: Performed by: ADVANCED PRACTICE MIDWIFE

## 2019-03-26 PROCEDURE — 4A1HXCZ MONITORING OF PRODUCTS OF CONCEPTION, CARDIAC RATE, EXTERNAL APPROACH: ICD-10-PCS | Performed by: ADVANCED PRACTICE MIDWIFE

## 2019-03-26 PROCEDURE — 85018 HEMOGLOBIN: CPT | Performed by: ADVANCED PRACTICE MIDWIFE

## 2019-03-26 PROCEDURE — 10907ZC DRAINAGE OF AMNIOTIC FLUID, THERAPEUTIC FROM PRODUCTS OF CONCEPTION, VIA NATURAL OR ARTIFICIAL OPENING: ICD-10-PCS | Performed by: ADVANCED PRACTICE MIDWIFE

## 2019-03-26 PROCEDURE — 25000128 H RX IP 250 OP 636: Performed by: NURSE ANESTHETIST, CERTIFIED REGISTERED

## 2019-03-26 PROCEDURE — 86901 BLOOD TYPING SEROLOGIC RH(D): CPT | Performed by: ADVANCED PRACTICE MIDWIFE

## 2019-03-26 PROCEDURE — 0064U ANTB TP TOTAL&RPR IA QUAL: CPT | Performed by: ADVANCED PRACTICE MIDWIFE

## 2019-03-26 RX ORDER — OXYTOCIN/0.9 % SODIUM CHLORIDE 30/500 ML
PLASTIC BAG, INJECTION (ML) INTRAVENOUS
Status: DISPENSED
Start: 2019-03-26 | End: 2019-03-27

## 2019-03-26 RX ORDER — LANOLIN 100 %
OINTMENT (GRAM) TOPICAL
Status: DISCONTINUED | OUTPATIENT
Start: 2019-03-26 | End: 2019-03-27 | Stop reason: HOSPADM

## 2019-03-26 RX ORDER — LIDOCAINE 50 MG/G
OINTMENT TOPICAL DAILY PRN
Status: DISCONTINUED | OUTPATIENT
Start: 2019-03-26 | End: 2019-03-27 | Stop reason: HOSPADM

## 2019-03-26 RX ORDER — ONDANSETRON 2 MG/ML
4 INJECTION INTRAMUSCULAR; INTRAVENOUS EVERY 6 HOURS PRN
Status: DISCONTINUED | OUTPATIENT
Start: 2019-03-26 | End: 2019-03-26

## 2019-03-26 RX ORDER — CARBOPROST TROMETHAMINE 250 UG/ML
250 INJECTION, SOLUTION INTRAMUSCULAR
Status: DISCONTINUED | OUTPATIENT
Start: 2019-03-26 | End: 2019-03-26

## 2019-03-26 RX ORDER — VITAMIN A ACETATE, .BETA.-CAROTENE, ASCORBIC ACID, CHOLECALCIFEROL, .ALPHA.-TOCOPHEROL ACETATE, DL-, THIAMINE MONONITRATE, RIBOFLAVIN, NIACINAMIDE, PYRIDOXINE HYDROCHLORIDE, FOLIC ACID, CYANOCOBALAMIN, CALCIUM CARBONATE, FERROUS FUMARATE, ZINC OXIDE, AND CUPRIC OXIDE 2000; 2000; 120; 400; 22; 1.84; 3; 20; 10; 1; 12; 200; 27; 25; 2 [IU]/1; [IU]/1; MG/1; [IU]/1; MG/1; MG/1; MG/1; MG/1; MG/1; MG/1; UG/1; MG/1; MG/1; MG/1; MG/1
1 TABLET ORAL DAILY
Status: DISCONTINUED | OUTPATIENT
Start: 2019-03-26 | End: 2019-03-27 | Stop reason: HOSPADM

## 2019-03-26 RX ORDER — OXYTOCIN/0.9 % SODIUM CHLORIDE 30/500 ML
100-340 PLASTIC BAG, INJECTION (ML) INTRAVENOUS CONTINUOUS PRN
Status: COMPLETED | OUTPATIENT
Start: 2019-03-26 | End: 2019-03-26

## 2019-03-26 RX ORDER — LIDOCAINE HYDROCHLORIDE 10 MG/ML
INJECTION, SOLUTION EPIDURAL; INFILTRATION; INTRACAUDAL; PERINEURAL
Status: DISCONTINUED
Start: 2019-03-26 | End: 2019-03-26 | Stop reason: HOSPADM

## 2019-03-26 RX ORDER — LIDOCAINE HYDROCHLORIDE AND EPINEPHRINE 15; 5 MG/ML; UG/ML
INJECTION, SOLUTION EPIDURAL PRN
Status: DISCONTINUED | OUTPATIENT
Start: 2019-03-26 | End: 2019-03-26

## 2019-03-26 RX ORDER — ACETAMINOPHEN 325 MG/1
325-650 TABLET ORAL EVERY 4 HOURS PRN
Status: DISCONTINUED | OUTPATIENT
Start: 2019-03-26 | End: 2019-03-27 | Stop reason: HOSPADM

## 2019-03-26 RX ORDER — EPHEDRINE SULFATE 50 MG/ML
5 INJECTION, SOLUTION INTRAMUSCULAR; INTRAVENOUS; SUBCUTANEOUS
Status: DISCONTINUED | OUTPATIENT
Start: 2019-03-26 | End: 2019-03-26

## 2019-03-26 RX ORDER — NALOXONE HYDROCHLORIDE 0.4 MG/ML
.1-.4 INJECTION, SOLUTION INTRAMUSCULAR; INTRAVENOUS; SUBCUTANEOUS
Status: DISCONTINUED | OUTPATIENT
Start: 2019-03-26 | End: 2019-03-26

## 2019-03-26 RX ORDER — FENTANYL/BUPIVACAINE/NS/PF 2-1250MCG
PLASTIC BAG, INJECTION (ML) INJECTION CONTINUOUS PRN
Status: DISCONTINUED | OUTPATIENT
Start: 2019-03-26 | End: 2019-03-26

## 2019-03-26 RX ORDER — IBUPROFEN 800 MG/1
800 TABLET, FILM COATED ORAL EVERY 8 HOURS
Status: DISCONTINUED | OUTPATIENT
Start: 2019-03-26 | End: 2019-03-27 | Stop reason: HOSPADM

## 2019-03-26 RX ORDER — LIDOCAINE 50 MG/G
OINTMENT TOPICAL DAILY PRN
Status: DISCONTINUED | OUTPATIENT
Start: 2019-03-26 | End: 2019-03-26

## 2019-03-26 RX ORDER — MISOPROSTOL 200 UG/1
200 TABLET ORAL
Status: DISCONTINUED | OUTPATIENT
Start: 2019-03-26 | End: 2019-03-26

## 2019-03-26 RX ORDER — NALBUPHINE HYDROCHLORIDE 20 MG/ML
2.5-5 INJECTION, SOLUTION INTRAMUSCULAR; INTRAVENOUS; SUBCUTANEOUS EVERY 6 HOURS PRN
Status: DISCONTINUED | OUTPATIENT
Start: 2019-03-26 | End: 2019-03-26

## 2019-03-26 RX ORDER — AMOXICILLIN 250 MG
2 CAPSULE ORAL 2 TIMES DAILY
Status: DISCONTINUED | OUTPATIENT
Start: 2019-03-26 | End: 2019-03-27 | Stop reason: HOSPADM

## 2019-03-26 RX ORDER — ONDANSETRON 4 MG/1
4 TABLET, ORALLY DISINTEGRATING ORAL EVERY 6 HOURS PRN
Status: DISCONTINUED | OUTPATIENT
Start: 2019-03-26 | End: 2019-03-26

## 2019-03-26 RX ORDER — AMOXICILLIN 250 MG
1 CAPSULE ORAL 2 TIMES DAILY
Status: DISCONTINUED | OUTPATIENT
Start: 2019-03-26 | End: 2019-03-27 | Stop reason: HOSPADM

## 2019-03-26 RX ORDER — OXYTOCIN 10 [USP'U]/ML
10 INJECTION, SOLUTION INTRAMUSCULAR; INTRAVENOUS
Status: DISCONTINUED | OUTPATIENT
Start: 2019-03-26 | End: 2019-03-26

## 2019-03-26 RX ORDER — IBUPROFEN 800 MG/1
800 TABLET, FILM COATED ORAL
Status: COMPLETED | OUTPATIENT
Start: 2019-03-26 | End: 2019-03-26

## 2019-03-26 RX ORDER — EPHEDRINE SULFATE 50 MG/ML
INJECTION, SOLUTION INTRAMUSCULAR; INTRAVENOUS; SUBCUTANEOUS
Status: DISPENSED
Start: 2019-03-26 | End: 2019-03-27

## 2019-03-26 RX ORDER — OXYTOCIN/0.9 % SODIUM CHLORIDE 30/500 ML
340 PLASTIC BAG, INJECTION (ML) INTRAVENOUS CONTINUOUS PRN
Status: DISCONTINUED | OUTPATIENT
Start: 2019-03-26 | End: 2019-03-27 | Stop reason: HOSPADM

## 2019-03-26 RX ORDER — LIDOCAINE HYDROCHLORIDE 10 MG/ML
INJECTION, SOLUTION EPIDURAL; INFILTRATION; INTRACAUDAL; PERINEURAL PRN
Status: DISCONTINUED | OUTPATIENT
Start: 2019-03-26 | End: 2019-03-26

## 2019-03-26 RX ORDER — LIDOCAINE 40 MG/G
CREAM TOPICAL
Status: DISCONTINUED | OUTPATIENT
Start: 2019-03-26 | End: 2019-03-26

## 2019-03-26 RX ORDER — METHYLERGONOVINE MALEATE 0.2 MG/ML
200 INJECTION INTRAVENOUS
Status: DISCONTINUED | OUTPATIENT
Start: 2019-03-26 | End: 2019-03-26

## 2019-03-26 RX ORDER — OXYTOCIN 10 [USP'U]/ML
10 INJECTION, SOLUTION INTRAMUSCULAR; INTRAVENOUS
Status: DISCONTINUED | OUTPATIENT
Start: 2019-03-26 | End: 2019-03-27 | Stop reason: HOSPADM

## 2019-03-26 RX ADMIN — Medication 8 ML/HR: at 13:40

## 2019-03-26 RX ADMIN — SODIUM CHLORIDE, POTASSIUM CHLORIDE, SODIUM LACTATE AND CALCIUM CHLORIDE 1000 ML: 600; 310; 30; 20 INJECTION, SOLUTION INTRAVENOUS at 12:15

## 2019-03-26 RX ADMIN — Medication 2 ML: at 13:30

## 2019-03-26 RX ADMIN — SODIUM CHLORIDE, POTASSIUM CHLORIDE, SODIUM LACTATE AND CALCIUM CHLORIDE 500 ML: 600; 310; 30; 20 INJECTION, SOLUTION INTRAVENOUS at 16:57

## 2019-03-26 RX ADMIN — Medication 3 ML: at 13:27

## 2019-03-26 RX ADMIN — OXYTOCIN-SODIUM CHLORIDE 0.9% IV SOLN 30 UNIT/500ML 340 ML/HR: 30-0.9/5 SOLUTION at 18:27

## 2019-03-26 RX ADMIN — LIDOCAINE HYDROCHLORIDE 3 ML: 10 INJECTION, SOLUTION EPIDURAL; INFILTRATION; INTRACAUDAL; PERINEURAL at 13:30

## 2019-03-26 RX ADMIN — LIDOCAINE HYDROCHLORIDE: 10; .005 INJECTION, SOLUTION EPIDURAL; INFILTRATION; INTRACAUDAL; PERINEURAL at 18:46

## 2019-03-26 RX ADMIN — SODIUM CHLORIDE 2.5 MILLION UNITS: 9 INJECTION, SOLUTION INTRAVENOUS at 15:07

## 2019-03-26 RX ADMIN — SODIUM CHLORIDE 5 MILLION UNITS: 9 INJECTION, SOLUTION INTRAVENOUS at 11:02

## 2019-03-26 RX ADMIN — IBUPROFEN 800 MG: 800 TABLET ORAL at 18:58

## 2019-03-26 ASSESSMENT — ACTIVITIES OF DAILY LIVING (ADL)
AMBULATION: 0-->INDEPENDENT
RETIRED_COMMUNICATION: 0-->UNDERSTANDS/COMMUNICATES WITHOUT DIFFICULTY
COGNITION: 0 - NO COGNITION ISSUES REPORTED
DRESS: 0-->INDEPENDENT
SWALLOWING: 0-->SWALLOWS FOODS/LIQUIDS WITHOUT DIFFICULTY
FALL_HISTORY_WITHIN_LAST_SIX_MONTHS: NO
BATHING: 0-->INDEPENDENT
TRANSFERRING: 0-->INDEPENDENT
TOILETING: 0-->INDEPENDENT
RETIRED_EATING: 0-->INDEPENDENT

## 2019-03-26 ASSESSMENT — MIFFLIN-ST. JEOR: SCORE: 1338.7

## 2019-03-26 NOTE — H&P
"ADMISSION NOTE FOR Tracey Caban on 3/26/2019.    H and P unchanged from prenatal   Lungs: clear  Per RN NP  Heart: RRR  Per RN NP    Tracey Caban is a 28 year old female  38w6d  Estimated Date of Delivery: Apr 3, 2019 is calculated from Patient's last menstrual period was 2018. [facility] on 3/26/2019 at 10:46 AM  in active labor.     Membranes are intact     PRENATAL COURSE   Prenatal vital signs WNL   Prenatal course was essentially uncomplicated     HISTORIES   Allergies   Allergen Reactions     Nitrofurantoin Rash     Macrobid      No past medical history on file.   No past surgical history on file.   No family history on file.   Social History     Tobacco Use     Smoking status: Former Smoker     Types: Cigarettes     Last attempt to quit: 2016     Years since quitting: 3.2     Smokeless tobacco: Never Used   Substance Use Topics     Alcohol use: No      Obstetric History       T0      L0     SAB0   TAB0   Ectopic0   Multiple0   Live Births0       # Outcome Date GA Lbr Artemio/2nd Weight Sex Delivery Anes PTL Lv   1 Current                    LABS:     Lab Results   Component Value Date    ABO O 2018           Lab Results   Component Value Date    RH Pos 2018      Rhogam not indicated   No results found for: RUBELLAABIGG   No results found for: RPR   No results found for: HIV   Lab Results   Component Value Date    HGB 12.4 2019      Lab Results   Component Value Date    HEPBANG Nonreactive 2018      Lab Results   Component Value Date    GBS Positive 2019      Other significant lab:    None.     PHYSICAL EXAM:     /80 (BP Location: Right arm)   Pulse 87   Temp 97.9  F (36.6  C) (Oral)   Resp 16   Ht 1.651 m (5' 5\")   Wt 60.8 kg (134 lb)   LMP 2018   SpO2 100%   BMI 22.30 kg/m    Neuro: denies headache and visual disturbances   Edema n Reflexes +2 per RN NP    Abdomen: gravid, single vertex fetus, non-tender, " Estimated Fetal Weight: 7 lb     FETAL HEART TONES Cat I  Cervix 4 cm per RN NP    ASSESSMENT:   IUP @ 38w6d in active labor.  NST reactive.    GBS Positive     PLAN:   GBS protocol  Prepare for delivery     TEO Morrissey, TWILAM

## 2019-03-26 NOTE — PLAN OF CARE
Brandon Aguilar CRNA called and in room at 1315. Patient and procedure correctly identified/verified with CRNA. Consent signed.  Patient in position for epidural placement. Epidural placed without complications. Test dose/bolus given by CRNA and patient tolerated well. Patient rates her pain after procedure as 2/10

## 2019-03-26 NOTE — PROGRESS NOTES
Cervix:  9 cm  Fetal Heart Rate Tracing: Cat 1 and II (mostly cat 1)  Contractions  1-3  Comfort level 4/10    Assessment:   Lightly meconium stained fluid  Progressing well  Occasional minimal variability    Plan:   AROM  Fluid bolus/position change    TEO Morrissey, CNM

## 2019-03-26 NOTE — PLAN OF CARE
OB Triage Note  Tracey Caban  MRN: [id number]  Gestational Age: 38w6d      Tracey Caban presents for contractions.     States chica since 330.  Rates pain at 6/10.  Bleeding: spotting pink  began at 06.  Denies LOF.    Tom Ramos CNM notified of arrival and condition.  Oriented patient to surroundings. Call light within reach.     FHT: 125  NST Start Time:912  NST Stop Time:  NST: Reactive 925.  Uterine Assessment:Contractions: frequency q 1.5-2 minutes, Strength moderate and Duration 60-80 seconds of moderate quality.    Plan:  -Initial NST, then fetal/uterine monitoring per MD/patient plan.  -Sterile vaginal exam/sterile speculum exam.  -Nursing education on labor provided.

## 2019-03-26 NOTE — PLAN OF CARE
Pt. Reports pain 3/10 above the pubic bone pain with contractions. States the left side is more numb than the right. Pt. Pushed the epidural PCA. Will assess if she has relief of discomfort otherwise will notify CRNA. Repositioning every 30 minutes from side to side and using the peanut ball.

## 2019-03-26 NOTE — PROGRESS NOTES
Cervix:  5 cm with large bulging bag in front of head  Fetal Heart Rate Tracing: Cat I  Contractions  2-3 minutes  Comfort level denies pain    Assessment:   Comfortable with Epidural    Plan:   Consider AROM and/or Pitocin augmentation IF needed.    TEO Morrissey, CNM

## 2019-03-26 NOTE — PLAN OF CARE
Labor Admission  Tracey Caban  MRN: [id number]  Gestational Age: 38w6d      Tracey Caban is admitted for active labor.  States chica since 330.  Rates pain at 6/10.  spotting pink began at 5:30.  Denies LOF. Tom Ramos CNM notified of arrival and condition and Intrapartum orders initiated.      FHT: 120's-130's  NST: Reactive.  Uterine Assessment:1.5-2.5 minutes, moderate, 60-80 seconds.     Patient is alert and oriented X 3,Patient oriented to room, unit, hourly rounding, and plan of care.  Call light within reach. Explained admission packet with patient bill of rights brochure. Will continue to monitor and document as needed.     Inpatient nursing criteria listed below was met:    Health care directives status obtained and documented: Yes  Patient identifies a surrogate decision maker: Yes   If yes, who: [place]   Core Measure diagnosis present:: Yes  Vaccine assessment done and vaccines ordered if appropriate. Yes  Clergy visit ordered if patient requests: N/A  Skin issues/needs documented:Yes  Isolation needs addressed, if appropriate: N/A  Fall Prevention (Med and High risk): Care plan updated, Education given and documented and signage used: Yes  Care Plan initiated: Yes  Education Documented (Reminder to educate patient if MRSA is present on admission): Yes  Education Assessment documented:Yes  Patient has discharge needs (If yes, please explain): Yes, routine postpartum and  care

## 2019-03-26 NOTE — PLAN OF CARE
Tracey Caban    NST:  reactive  Start: 0910  Stop: 0940    Physician: Tom Ramos CNM  Reason For Test: R/O labor  EDC:4-3-19  Gestational Age: 38 6/7      Mariella Ray

## 2019-03-26 NOTE — ANESTHESIA PROCEDURE NOTES
"  Peripheral nerve/Neuraxial procedure note : epidural catheter  Pre-Procedure  Performed by   Referred by RAFAT DURAN  Location: OB    Procedure Times:3/26/2019 1:19 PM and 3/26/2019 1:42 PM  Pre-Anesthestic Checklist: patient identified, IV checked, site marked, risks and benefits discussed, informed consent, monitors and equipment checked, pre-op evaluation and at physician/surgeon's request    Timeout  Correct Patient: Yes   Correct Procedure: Yes   Correct Site: Yes   Correct Laterality: N/A   Correct Position: Yes   Site Marked: Yes   .   Procedure Documentation    Diagnosis:IUP, labor pain.    Procedure:    Epidural catheter.  Insertion Site:L3-4 Injection technique: LORT air   Local skin infiltrated with 2 mL of 1% lidocaine.  RICHARD at 4 cm     Patient Prep;chlorhexidine gluconate and isopropyl alcohol.  .  Needle: ToTrainfoxy needle Needle Gauge: 18.    Needle Length (Inches) 3.5  # of attempts: 1 and  # of redirects:  1 .   Catheter: 20 G . .  Catheter threaded easily  5 cm epidural space.  9 cm at skin.   .    Assessment/Narrative  Paresthesias: Resolved and No.  .  .  Aspiration negative for heme or CSF  . Test dose of 3 mL lidocaine 1.5% w/ 1:200,000 epinephrine at 13:27.  Test dose negative for signs of intravascular, subdural or intrathecal injection. Comments:  Patient interviewed, assessed, and consented for labor epidural. Prior to therapy, patient reported pain 8 out of 10 with contractions. Sterility maintained throughout procedure. Test dose negative. After bolus dose, patient reported \"1 or 2\" out of 10 pain with contractions.        "

## 2019-03-26 NOTE — ANESTHESIA PREPROCEDURE EVALUATION
"Anesthesia Pre-Procedure Evaluation    Patient: Tracey Caban   MRN: [id number] : 1990          Preoperative Diagnosis: * No surgery found *        No past medical history on file.  No past surgical history on file.    Anesthesia Evaluation       history and physical reviewed .      No history of anesthetic complications          ROS/MED HX    ENT/Pulmonary:  - neg pulmonary ROS     Neurologic:  - neg neurologic ROS     Cardiovascular:  - neg cardiovascular ROS       METS/Exercise Tolerance:     Hematologic:         Musculoskeletal:         GI/Hepatic:     (+) GERD (with pregnancy)       Renal/Genitourinary:         Endo:         Psychiatric:         Infectious Disease:         Malignancy:         Other:                     neg OB ROS            Physical Exam      Airway   Mallampati: II  TM distance: > 3 FB  Neck ROM: full  Mouth opening: > 3 cm    Dental     Cardiovascular       Pulmonary             Lab Results   Component Value Date    WBC 10.1 2019    HGB 13.1 2019    HCT 36.7 2019     2019       Preop Vitals  BP Readings from Last 3 Encounters:   19 119/96   19 100/68   19 98/68    Pulse Readings from Last 3 Encounters:   19 87   19 77   18 88      Resp Readings from Last 3 Encounters:   19 16    SpO2 Readings from Last 3 Encounters:   19 100%      Temp Readings from Last 1 Encounters:   19 98.2  F (36.8  C) (Oral)    Ht Readings from Last 1 Encounters:   19 1.651 m (5' 5\")      Wt Readings from Last 1 Encounters:   19 60.8 kg (134 lb)    Estimated body mass index is 22.3 kg/m  as calculated from the following:    Height as of this encounter: 1.651 m (5' 5\").    Weight as of this encounter: 60.8 kg (134 lb).       Anesthesia Plan      History & Physical Review      ASA Status:  .  OB Epidural Asa: 2       Plan for     Discussed risks and benefits with patient including itching, sore back, infection, " hematoma, spinal headache, CV complications, inability to place, nerve damage. Pt wishes to proceed.         Postoperative Care      Consents  Anesthetic plan, risks, benefits and alternatives discussed with:  Patient..                 TEO Calhoun CRNA

## 2019-03-26 NOTE — PROGRESS NOTES
Cervix:  5 cm per RN  Fetal Heart Rate Tracing: Cat I  Contractions  2-3 minutes  Comfort level 8/10    Assessment:   Progressing nicely  Increased discomfort  Requesting Epidural    Plan:   Notify anesthesia     Tom Ramos, APRN, CNM

## 2019-03-26 NOTE — PROGRESS NOTES
Cervix:  8 cm with large bulging bag  Fetal Heart Rate Tracing: Cat I  Contractions  1-3  Comfort level tolerating well with Epidural    Assessment:   Progressing well    Plan:   Prepare for delivery    TEO Morrissey, TWILAM

## 2019-03-27 VITALS
SYSTOLIC BLOOD PRESSURE: 105 MMHG | RESPIRATION RATE: 16 BRPM | WEIGHT: 134 LBS | BODY MASS INDEX: 22.33 KG/M2 | HEART RATE: 70 BPM | TEMPERATURE: 98.6 F | DIASTOLIC BLOOD PRESSURE: 64 MMHG | HEIGHT: 65 IN | OXYGEN SATURATION: 95 %

## 2019-03-27 LAB — HGB BLD-MCNC: 9.6 G/DL (ref 11.7–15.7)

## 2019-03-27 PROCEDURE — 36415 COLL VENOUS BLD VENIPUNCTURE: CPT | Performed by: ADVANCED PRACTICE MIDWIFE

## 2019-03-27 PROCEDURE — 25000132 ZZH RX MED GY IP 250 OP 250 PS 637: Performed by: ADVANCED PRACTICE MIDWIFE

## 2019-03-27 PROCEDURE — 85018 HEMOGLOBIN: CPT | Performed by: ADVANCED PRACTICE MIDWIFE

## 2019-03-27 RX ORDER — FERROUS SULFATE 325(65) MG
325 TABLET ORAL 3 TIMES DAILY
Status: DISCONTINUED | OUTPATIENT
Start: 2019-03-27 | End: 2019-03-27 | Stop reason: HOSPADM

## 2019-03-27 RX ORDER — DOCUSATE SODIUM 100 MG/1
100 CAPSULE, LIQUID FILLED ORAL 4 TIMES DAILY
Qty: 120 CAPSULE | Refills: 2 | Status: SHIPPED | OUTPATIENT
Start: 2019-03-27 | End: 2019-04-15

## 2019-03-27 RX ORDER — FERROUS SULFATE 325(65) MG
325 TABLET, DELAYED RELEASE (ENTERIC COATED) ORAL 2 TIMES DAILY
Qty: 90 TABLET | Refills: 1 | Status: SHIPPED | OUTPATIENT
Start: 2019-03-27 | End: 2019-04-15

## 2019-03-27 RX ADMIN — SENNOSIDES AND DOCUSATE SODIUM 1 TABLET: 8.6; 5 TABLET ORAL at 09:15

## 2019-03-27 RX ADMIN — IBUPROFEN 800 MG: 800 TABLET ORAL at 06:34

## 2019-03-27 RX ADMIN — FERROUS SULFATE TAB 325 MG (65 MG ELEMENTAL FE) 325 MG: 325 (65 FE) TAB at 14:01

## 2019-03-27 RX ADMIN — Medication 1 TABLET: at 09:15

## 2019-03-27 RX ADMIN — IBUPROFEN 800 MG: 800 TABLET ORAL at 13:11

## 2019-03-27 NOTE — DISCHARGE SUMMARY
"[facility]   Obstetrics PostPartum  / Discharge Note   ppday 1   Subjective:     Breast feeding: y          Medications:   All medications related to the patient's hospitalization have been reviewed           Physical Exam:   Awake alert with appropriate affect    /64   Pulse 70   Temp 98.6  F (37  C) (Oral)   Resp 16   Ht 1.651 m (5' 5\")   Wt 60.8 kg (134 lb)   LMP 2018   SpO2 95%   Breastfeeding? Unknown   BMI 22.30 kg/m      Fundus : firm   Legs: negative Yolanda's              Data:   All laboratory data related to this ob reviewed    Hemoglobin   Date Value Ref Range Status   2019 9.6 (L) 11.7 - 15.7 g/dL Final   ]  Lab Results   Component Value Date    ABO O 2019      Lab Results   Component Value Date    RH Pos 2019            Assessment and Plan:    Assessment:   PPD #1   with 2nd degree tear with repair  BF  ABLA without symptoms        Plan:   Discharge to home  Ferrous sulfate 325 mg PO BID  Colace as needed  Follow up with provider in 2 weeks             TEO Morrissey, TWILAM      "

## 2019-03-27 NOTE — PROGRESS NOTES
"[facility]   Obstetrics PostPartum  / Progress Note   ppday 1   Subjective:   2nd degree lac with repair  Acute blood loss anemeia (ABLA)  Breast feeding: y          Medications:   All medications related to the patient's hospitalization have been reviewed           Physical Exam:   Awake alert with appropriate affect    /69   Pulse 70   Temp 98  F (36.7  C) (Oral)   Resp 18   Ht 1.651 m (5' 5\")   Wt 60.8 kg (134 lb)   LMP 2018   SpO2 98%   Breastfeeding? Unknown   BMI 22.30 kg/m      Fundus : firm   Legs: negative Yolanda's              Data:   All laboratory data related to this ob reviewed    Hemoglobin   Date Value Ref Range Status   2019 9.6 (L) 11.7 - 15.7 g/dL Final   ]  Lab Results   Component Value Date    ABO O 2019      Lab Results   Component Value Date    RH Pos 2019            Assessment and Plan:    Assessment:     Breastfeeding  ABLA not symptomatic at this time          Plan:   Routine PP  Lactation support  Ferrous sulfate 325 mg PO TID             TEO Morrissey, CNM      "

## 2019-03-27 NOTE — PLAN OF CARE
Vaginal Delivery Note   of viable Male at 1824.  Nursery RN Martine VILA present.  Infant with spontaneous cry, to mother's abdomen, dried and stimulated. 30 units of IV pitocin infusing as ordered. Liliana cares provided.  Placenta delivered. Vaginal repair in progress. Mother and baby in stable condition. Baby skin-to-skin with mom. ID bands placed on infant, mom and FOB [place].

## 2019-03-27 NOTE — L&D DELIVERY NOTE
Delivery Summary    Tracey Caban MRN# [id number]   Age: 28 year old YOB: 1990     ASSESSMENT & PLAN:     2nd degree perineum with repair  Edematous perineum    Ice pad to perineum  Routine PP cares       Labor Event Times    Labor onset date:  3/26/19 Onset time:   6:30 AM   Dilation complete date:  3/26/19 Complete time:   5:41 PM   Start pushing date/time:  3/26/2019 1750      Labor Length    1st Stage (hrs):  11 (min):  11   2nd Stage (hrs):  0 (min):  43   3rd Stage (hrs):  0 (min):  10      Labor Events     labor?:  No   steroids:  None  Labor Type:  Spontaneous     Antibiotics received during labor?:  Yes  Reason for Antibiotics:  GBS  Antibiotics received for GBS:  Penicillin     Rupture date/time:     Rupture type:  Artificial Rupture of Membranes  Fluid color:  Meconium  Fluid odor:  Normal     Delivery/Placenta Date and Time    Delivery Date:  3/26/19 Delivery Time:   6:24 PM   Placenta Date/Time:  3/26/2019  6:34 PM  Oxytocin given at the time of delivery:  after delivery of baby     Vaginal Counts     Initial count performed by 2 team members:   Two Team Members   Mariella Farley RN       Needles Suture [place] Sponges Instruments   Initial counts 1  15 8   Added to count  2     Final counts 1 2 15 8   Placed during labor Accounted for at the end of labor   NA NA   NA NA   NA NA    Final count performed by 2 team members:   Two Team Members   Tom Fowler RN      Final count correct?:  Yes     Apgars    Living status:  Living   1 Minute 5 Minute 10 Minute 15 Minute 20 Minute   Skin color: 1  1       Heart rate: 2  2       Reflex irritability: 2  2       Muscle tone: 2  2       Respiratory effort: 2  2       Total: 9  9       Apgars assigned by:  ABDIRASHID VILA     Cord    Vessels:  3 Vessels Complications:  None   Cord Blood Disposition:  Lab Gases Sent?:  No      [place] Resuscitation    Methods:  None  [place] Care at Delivery:  Viable baby boy  "born, bulb syringe and stimulated at perineum.  Cord clamp placed and cut at 1 min apgar and baby placed on mothers chest.  HR >100, alert and lusty cry.   Output in Delivery Room:  Stool     [place] Measurements    Weight:  6 lb 12.3 oz Length:  1' 7\"      Skin to Skin and Feeding Plan    Skin to skin initiation date/time: 1/3/1841    Skin to skin with:  Mother  Skin to skin end date/time:        Labor Events and Shoulder Dystocia    Fetal Tracing Prior to Delivery:  Category 1, Category 2  Fetal Tracing Comments:  mostly Cat I  Shoulder dystocia present?:  Neg     Delivery (Maternal) (Provider to Complete) (319406)    Episiotomy:  None  Perineal lacerations:  2nd Repaired?:  Yes   Vaginal laceration?:  No    Cervical laceration?:  No    Est. blood loss (mL):  350  Number of repair packets:  2     Blood Loss  Mother: Tracey Cabna #[id number]   Start of Mother's Information    IO Blood Loss  19 0630 - 19 1955    EBL (mL) Hospital Encounter 350 mL    Total  350 mL         End of Mother's Information  Mother: Tracey Caban #[id number]         Delivery - Provider to Complete (635240)    Delivering clinician:  Tom Ramos APRN CNM  Attempted Delivery Types (Choose all that apply):  Spontaneous Vaginal Delivery  Delivery Type (Choose the 1 that will go to the Birth History):  Vaginal, Spontaneous   Other personnel:   Provider Role   Amanuel Cornell MD Pediatrician         Placenta    Delayed Cord Clamping:  Done  Date/Time:  3/26/2019  6:34 PM  Removal:  Spontaneous  Disposition:  Hospital disposal     Anesthesia    Method:  Epidural  Cervical dilation at placement:  4-7          Presentation and Position    Position:  Middle Occiput Anterior       Boy Da    TEO Reed CNM  "

## 2019-03-27 NOTE — PLAN OF CARE
Assessments completed as charted. B/P: 101/69, T: 98, P: 70, R: 18. Rates pain: 2/10 perineum discomfort. Using motrin, icepack, and tucks. Plans to take a whirlpool tub. Voiding without difficulty. Fundus: firm. Lochia: Light. Activity: moving with mild difficulty d/t perineum pain . Infant feeding: Breast feeding going with mild difficulty .     LATCH Score:   Latch: 1 - Repeated Attempts  Audible Swallowin - Spontaneous & frequent  Type of Nipple: (Breast/Nipple) 2 - Everted  Comfort: 2 - Soft, Nontender  Hold: 0 - Full Assist   Total LATCH Score: 7    Postpartum breastfeeding assessment completed and education provided, see Patient Education Activity.  Items included in the education are:   proper positioning and latch  effectiveness of feeding  manual expression  handling and storing breastmilk  maintenance of breastfeeding for the first 6 months  sign/symptoms of infant feeding issues requiring referral to qualified health care provider  Postpartum care education provided, see Patient Education activity. Patient denies needs. Will monitor.  Mariella Ray

## 2019-03-27 NOTE — PLAN OF CARE
Assessments completed as charted. B/P: 117/67, T: 98.4, P: 80, R: 16. Rates pain: 0/10 denies any pain; reports vaginal/perineum is sore. Second degree laceration with repair. Hematoma noted, swelling and not extending, using phan ice packs, dermoplast  spray, tucks pads, and phan bottle for comfort.  Voiding without difficulty. Fundus: Midline 1/U. Lochia: Light. Activity: unrestricted with out pain . Infant feeding: working on establishing breast feeding .     LATCH Score:   Latch: 1 - Repeated Attempts  Audible Swallowin - Few  Type of Nipple: (Breast/Nipple) 2 - Everted  Comfort: 2 - Soft, Nontender  Hold: 1 - Min. Assist   Total LATCH Score: 7    Postpartum care education provided, see Patient Education activity. Patient denies needs. Will monitor.  Tash Webster

## 2019-03-27 NOTE — DISCHARGE INSTRUCTIONS
Postpartum Vaginal Delivery Instructions    Activity       Ask family and friends for help when you need it.    Do not place anything in your vagina for 6 weeks or until your provider ok's it.    You are not restricted on other activities, but take it easy for a few weeks to allow your body to recover from delivery.  You are able to do any activities you feel up to that point.    No driving until you have stopped taking your narcotic pain medications (usually two weeks after delivery).     Call your health care provider if you have any of these symptoms:       Increased pain, swelling, redness, or fluid around your stiches from an episiotomy or perineal tear.    A fever above 100.4 F (38 C) with or without chills when placing a thermometer under your tongue.    You soak a sanitary pad with blood within 1 hour, or you see blood clots larger than a golf ball.    Bleeding that lasts more than 6 weeks.    Vaginal discharge that smells bad.    Severe pain, cramping or tenderness in your lower belly area.    A need to urinate more frequently (use the toilet more often), more urgently (use the toilet very quickly), or it burns when you urinate.    Nausea and vomiting.    Redness, swelling or pain around a vein in your leg.    Problems breastfeeding or a red or painful area on your breast.    Chest pain and cough or are gasping for air.    Problems coping with sadness, anxiety, or depression.  If you have any concerns about hurting yourself or the baby, call your provider immediately.     You have questions or concerns after you return home.     Keep your hands clean:  Always wash your hands before touching your perineal area and stitches.  This helps reduce your risk of infection.  If your hands aren't dirty, you may use an alcohol hand-rub to clean your hands. Keep your nails clean and short.

## 2019-03-27 NOTE — PLAN OF CARE
Pt. Reports perineum pain improved after taking a whirlpool tub. Continues to use the motrin, ice pack, tucks and dermoplast. Fundus firm, bleeding stable. Bonding well with infant.

## 2019-03-27 NOTE — PLAN OF CARE
Face to face report given with opportunity to observe patient.    Report given to Oralia Ray   3/26/2019  7:17 PM

## 2019-03-27 NOTE — PLAN OF CARE
Assessments completed as charted. B/P: 105/64, T: 98.6, P: 70, R: 16. Rates pain: 3/10 perineum discomfort. Voiding without difficulty. Fundus: Midline @U. Lochia: Light. Activity: normal activity. Infant feeding: Breast feeding going with mild difficulty .     LATCH Score:   Latch: 1 - Repeated Attempts  Audible Swallowin - Spontaneous & frequent  Type of Nipple: (Breast/Nipple) 2 - Everted  Comfort: 2 - Soft, Nontender  Hold: 1 - Min. Assist   Total LATCH Score: 8    Postpartum breastfeeding assessment completed and education provided, see Patient Education Activity.  Items included in the education are:   proper positioning and latch  effectiveness of feeding  manual expression  handling and storing breastmilk  maintenance of breastfeeding for the first 6 months  sign/symptoms of infant feeding issues requiring referral to qualified health care provider  Postpartum care education provided, see Patient Education activity. Patient denies needs. Will monitor.  Mariella Ray

## 2019-03-27 NOTE — PLAN OF CARE
Patient up to bathroom for first time. Patient felt a little light headed when she first stood up so she sat back down for a while with quick recovery. Up again and ambulated to bathroom with no light head sensation or dizziness. Patient voided with no difficulties and toleterated activity well.

## 2019-03-28 LAB — T PALLIDUM AB SER QL: NONREACTIVE

## 2019-03-28 NOTE — PLAN OF CARE
Face to face report given with opportunity to observe patient.    Report given to Brigitte Ray   3/27/2019  7:39 PM

## 2019-03-28 NOTE — PLAN OF CARE
Patient discharged at 8:53 PM via ambulation accompanied by significant other and staff. Prescriptions sent to patients preferred pharmacy. All belongings sent with patient.     Discharge instructions reviewed with PT and Significant other. Listed belongings gathered and returned to patient.     Patient discharged to home.     Core Measures and Vaccines  Core Measures applicable during stay: none  Pneumonia and Influenza given prior to discharge, if indicated: N/A    Surgical Patient   Surgical Procedures during stay: na  Did patient receive discharge instruction on wound care and recognition of infection symptoms? N/A    MISC  Follow up appointment made:  Yes  Home and hospital aquired medications returned to patient: N/A  Patient reports pain was well managed at discharge: Yes

## 2019-03-28 NOTE — ANESTHESIA POSTPROCEDURE EVALUATION
Patient: Tracey Caban    * No procedures listed *    Diagnosis:* No pre-op diagnosis entered *  Diagnosis Additional Information: No value filed.    Anesthesia Type:  No value filed.    Note:  Anesthesia Post Evaluation    Patient location during evaluation: Bedside and Floor  Patient participation: Able to fully participate in evaluation  Level of consciousness: awake and alert  Pain management: adequate  Airway patency: patent  Cardiovascular status: acceptable  Respiratory status: acceptable  Hydration status: acceptable  PONV: none     Anesthetic complications: None          Last vitals:  Vitals:    03/27/19 0412 03/27/19 0720 03/27/19 1515   BP: 105/65 101/69 105/64   Pulse: 70     Resp: 18 18 16   Temp: 97.7  F (36.5  C) 98  F (36.7  C) 98.6  F (37  C)   SpO2: 97% 98% 95%         Electronically Signed By: TEO Calhoun CRNA  March 28, 2019  7:51 AM

## 2019-04-15 ENCOUNTER — PRENATAL OFFICE VISIT (OUTPATIENT)
Dept: OBGYN | Facility: OTHER | Age: 29
End: 2019-04-15
Attending: ADVANCED PRACTICE MIDWIFE
Payer: COMMERCIAL

## 2019-04-15 VITALS — HEIGHT: 65 IN | BODY MASS INDEX: 22.3 KG/M2 | SYSTOLIC BLOOD PRESSURE: 100 MMHG | DIASTOLIC BLOOD PRESSURE: 62 MMHG

## 2019-04-15 PROCEDURE — 99207 ZZC NO CHARGE LOS: CPT | Performed by: ADVANCED PRACTICE MIDWIFE

## 2019-04-15 PROCEDURE — G0463 HOSPITAL OUTPT CLINIC VISIT: HCPCS

## 2019-04-15 ASSESSMENT — ANXIETY QUESTIONNAIRES
2. NOT BEING ABLE TO STOP OR CONTROL WORRYING: NOT AT ALL
IF YOU CHECKED OFF ANY PROBLEMS ON THIS QUESTIONNAIRE, HOW DIFFICULT HAVE THESE PROBLEMS MADE IT FOR YOU TO DO YOUR WORK, TAKE CARE OF THINGS AT HOME, OR GET ALONG WITH OTHER PEOPLE: NOT DIFFICULT AT ALL
4. TROUBLE RELAXING: NOT AT ALL
1. FEELING NERVOUS, ANXIOUS, OR ON EDGE: NOT AT ALL
6. BECOMING EASILY ANNOYED OR IRRITABLE: NOT AT ALL
GAD7 TOTAL SCORE: 1
5. BEING SO RESTLESS THAT IT IS HARD TO SIT STILL: NOT AT ALL
7. FEELING AFRAID AS IF SOMETHING AWFUL MIGHT HAPPEN: NOT AT ALL
3. WORRYING TOO MUCH ABOUT DIFFERENT THINGS: SEVERAL DAYS

## 2019-04-15 ASSESSMENT — PAIN SCALES - GENERAL: PAINLEVEL: NO PAIN (0)

## 2019-04-15 ASSESSMENT — PATIENT HEALTH QUESTIONNAIRE - PHQ9: SUM OF ALL RESPONSES TO PHQ QUESTIONS 1-9: 1

## 2019-04-15 NOTE — PATIENT INSTRUCTIONS
Return to office in 4 weeks for postpartum care and as needed.    Thank you for allowing Tom BRUCE CNM and our OB team to participate in your care.  If you have a scheduling or an appointment question please contact [facility] Unit Coordinator at their direct line [phone].   ALL nursing questions or concerns can be directed to your OB nurse at: [phone] Federico Roach/[place]

## 2019-04-15 NOTE — NURSING NOTE
"Chief Complaint   Patient presents with     Post Partum Exam     2 wk PP       Initial /62 (BP Location: Left arm, Patient Position: Chair, Cuff Size: Adult Regular)   Ht 1.651 m (5' 5\")   LMP 06/27/2018   BMI 22.30 kg/m   Estimated body mass index is 22.3 kg/m  as calculated from the following:    Height as of this encounter: 1.651 m (5' 5\").    Weight as of 3/26/19: 60.8 kg (134 lb).  Medication Reconciliation: complete    Marley Mckeon LPN    "

## 2019-04-15 NOTE — PROGRESS NOTES
"Tracey Caban is a 28 year old female  /62 (BP Location: Left arm, Patient Position: Chair, Cuff Size: Adult Regular)   Ht 1.651 m (5' 5\")   LMP 06/27/2018   BMI 22.30 kg/m    Pleasant  Breast feeding:  y        Baby name: Da   Spontaneous vaginal delivery: y Stitches: y  PHQ9:  1  Bleeding:  Light to moderate  Vulva:  okay  Family planning:  Abstinence until 6 weeks/Consider Progesterone only with BF  Other concerns:  no    Assessment:    PP 2+ weeks  BF  Family planning    Plan:   Continue BF  Progesterone only contraception pamphlets given  Return to office: 4 wks for 6 week PP visit    Greater than 20 were spent with this patient routine PP care  Tom BRUCE CNM    "

## 2019-04-16 ASSESSMENT — ANXIETY QUESTIONNAIRES: GAD7 TOTAL SCORE: 1

## 2019-05-13 ENCOUNTER — PRENATAL OFFICE VISIT (OUTPATIENT)
Dept: OBGYN | Facility: OTHER | Age: 29
End: 2019-05-13
Attending: ADVANCED PRACTICE MIDWIFE
Payer: COMMERCIAL

## 2019-05-13 VITALS
HEIGHT: 65 IN | BODY MASS INDEX: 18.83 KG/M2 | DIASTOLIC BLOOD PRESSURE: 62 MMHG | WEIGHT: 113 LBS | SYSTOLIC BLOOD PRESSURE: 94 MMHG

## 2019-05-13 PROCEDURE — G0463 HOSPITAL OUTPT CLINIC VISIT: HCPCS | Mod: 25

## 2019-05-13 PROCEDURE — G0463 HOSPITAL OUTPT CLINIC VISIT: HCPCS

## 2019-05-13 PROCEDURE — 99207 ZZC POST PARTUM EXAM: CPT | Performed by: ADVANCED PRACTICE MIDWIFE

## 2019-05-13 ASSESSMENT — MIFFLIN-ST. JEOR: SCORE: 1243.44

## 2019-05-13 ASSESSMENT — ANXIETY QUESTIONNAIRES
GAD7 TOTAL SCORE: 6
3. WORRYING TOO MUCH ABOUT DIFFERENT THINGS: MORE THAN HALF THE DAYS
7. FEELING AFRAID AS IF SOMETHING AWFUL MIGHT HAPPEN: NOT AT ALL
4. TROUBLE RELAXING: SEVERAL DAYS
IF YOU CHECKED OFF ANY PROBLEMS ON THIS QUESTIONNAIRE, HOW DIFFICULT HAVE THESE PROBLEMS MADE IT FOR YOU TO DO YOUR WORK, TAKE CARE OF THINGS AT HOME, OR GET ALONG WITH OTHER PEOPLE: NOT DIFFICULT AT ALL
2. NOT BEING ABLE TO STOP OR CONTROL WORRYING: SEVERAL DAYS
6. BECOMING EASILY ANNOYED OR IRRITABLE: SEVERAL DAYS
5. BEING SO RESTLESS THAT IT IS HARD TO SIT STILL: NOT AT ALL
1. FEELING NERVOUS, ANXIOUS, OR ON EDGE: SEVERAL DAYS

## 2019-05-13 ASSESSMENT — PATIENT HEALTH QUESTIONNAIRE - PHQ9: SUM OF ALL RESPONSES TO PHQ QUESTIONS 1-9: 5

## 2019-05-13 ASSESSMENT — PAIN SCALES - GENERAL: PAINLEVEL: NO PAIN (0)

## 2019-05-13 NOTE — NURSING NOTE
"Chief Complaint   Patient presents with     Post Partum Exam     6 week       Initial Ht 1.651 m (5' 5\")   Wt 51.3 kg (113 lb)   LMP 06/27/2018   BMI 18.80 kg/m   Estimated body mass index is 18.8 kg/m  as calculated from the following:    Height as of this encounter: 1.651 m (5' 5\").    Weight as of this encounter: 51.3 kg (113 lb).  Medication Reconciliation: complete    Lillian Hsu LPN  "

## 2019-05-13 NOTE — PROGRESS NOTES
"Tracey Caban is a 28 year old female is 6 weeks post partum  BP 94/62 (BP Location: Left arm, Patient Position: Chair, Cuff Size: Adult Regular)   Ht 1.651 m (5' 5\")   Wt 51.3 kg (113 lb)   LMP 06/27/2018   BMI 18.80 kg/m    Pleasant without acute distress  Breast feeding:  y        Baby name: Da   Spontaneous vaginal delivery: y Stitches: y   PHQ9:  5  Bleeding:  Light discharge  Vulva:  ok  Family planning:  Condoms and BF  Other concerns:  n    Pelvic:  Vagina and vulva are normal; well healed, no discharge is noted.    Cervix: normal without lesions.    Uterus:  mobile, normal in size and shape without tenderness.  Adnexa: without masses or tenderness.    Assessment:    PP 6 w  BF  Family planning:  condoms    Plan:   Continue BF    RTO for well woman care and as needed    Greater than 20 were spent with this patient on routine PP cares  Tom Ramos, TEO, CNM    "

## 2019-05-14 ASSESSMENT — ANXIETY QUESTIONNAIRES: GAD7 TOTAL SCORE: 6

## 2019-05-14 NOTE — PATIENT INSTRUCTIONS
Return to office in one year for well woman care and as needed.    Thank you for allowing Tom BRUCE CNM and our OB team to participate in your care.  If you have a scheduling or an appointment question please contact [facility] Unit Coordinator at their direct line [phone].   ALL nursing questions or concerns can be directed to your OB nurse at: [phone] Federico Roach/[place]

## 2019-09-16 ENCOUNTER — OFFICE VISIT (OUTPATIENT)
Dept: OBGYN | Facility: OTHER | Age: 29
End: 2019-09-16
Attending: ADVANCED PRACTICE MIDWIFE
Payer: COMMERCIAL

## 2019-09-16 VITALS
BODY MASS INDEX: 16.83 KG/M2 | WEIGHT: 101 LBS | HEART RATE: 64 BPM | HEIGHT: 65 IN | SYSTOLIC BLOOD PRESSURE: 90 MMHG | DIASTOLIC BLOOD PRESSURE: 60 MMHG

## 2019-09-16 DIAGNOSIS — Z01.419 WELL WOMAN EXAM WITH ROUTINE GYNECOLOGICAL EXAM: Primary | ICD-10-CM

## 2019-09-16 DIAGNOSIS — N63.10 LUMP OF RIGHT BREAST: ICD-10-CM

## 2019-09-16 DIAGNOSIS — Z12.4 ENCOUNTER FOR SCREENING FOR CERVICAL CANCER: ICD-10-CM

## 2019-09-16 LAB
ANION GAP SERPL CALCULATED.3IONS-SCNC: 7 MMOL/L (ref 3–14)
BUN SERPL-MCNC: 22 MG/DL (ref 7–30)
CALCIUM SERPL-MCNC: 9.3 MG/DL (ref 8.5–10.1)
CHLORIDE SERPL-SCNC: 109 MMOL/L (ref 94–109)
CO2 SERPL-SCNC: 26 MMOL/L (ref 20–32)
CREAT SERPL-MCNC: 0.72 MG/DL (ref 0.52–1.04)
GFR SERPL CREATININE-BSD FRML MDRD: >90 ML/MIN/{1.73_M2}
GLUCOSE SERPL-MCNC: 83 MG/DL (ref 70–99)
POTASSIUM SERPL-SCNC: 3.6 MMOL/L (ref 3.4–5.3)
SODIUM SERPL-SCNC: 142 MMOL/L (ref 133–144)
TSH SERPL DL<=0.005 MIU/L-ACNC: 1.4 MU/L (ref 0.4–4)

## 2019-09-16 PROCEDURE — 99395 PREV VISIT EST AGE 18-39: CPT | Performed by: ADVANCED PRACTICE MIDWIFE

## 2019-09-16 PROCEDURE — G0123 SCREEN CERV/VAG THIN LAYER: HCPCS | Performed by: ADVANCED PRACTICE MIDWIFE

## 2019-09-16 PROCEDURE — 36415 COLL VENOUS BLD VENIPUNCTURE: CPT | Performed by: ADVANCED PRACTICE MIDWIFE

## 2019-09-16 PROCEDURE — 84443 ASSAY THYROID STIM HORMONE: CPT | Performed by: ADVANCED PRACTICE MIDWIFE

## 2019-09-16 PROCEDURE — 80048 BASIC METABOLIC PNL TOTAL CA: CPT | Performed by: ADVANCED PRACTICE MIDWIFE

## 2019-09-16 ASSESSMENT — ANXIETY QUESTIONNAIRES
4. TROUBLE RELAXING: SEVERAL DAYS
GAD7 TOTAL SCORE: 5
2. NOT BEING ABLE TO STOP OR CONTROL WORRYING: SEVERAL DAYS
1. FEELING NERVOUS, ANXIOUS, OR ON EDGE: NOT AT ALL
6. BECOMING EASILY ANNOYED OR IRRITABLE: SEVERAL DAYS
3. WORRYING TOO MUCH ABOUT DIFFERENT THINGS: SEVERAL DAYS
7. FEELING AFRAID AS IF SOMETHING AWFUL MIGHT HAPPEN: NOT AT ALL
5. BEING SO RESTLESS THAT IT IS HARD TO SIT STILL: SEVERAL DAYS
IF YOU CHECKED OFF ANY PROBLEMS ON THIS QUESTIONNAIRE, HOW DIFFICULT HAVE THESE PROBLEMS MADE IT FOR YOU TO DO YOUR WORK, TAKE CARE OF THINGS AT HOME, OR GET ALONG WITH OTHER PEOPLE: NOT DIFFICULT AT ALL

## 2019-09-16 ASSESSMENT — PATIENT HEALTH QUESTIONNAIRE - PHQ9: SUM OF ALL RESPONSES TO PHQ QUESTIONS 1-9: 4

## 2019-09-16 ASSESSMENT — MIFFLIN-ST. JEOR: SCORE: 1189.01

## 2019-09-16 ASSESSMENT — PAIN SCALES - GENERAL: PAINLEVEL: NO PAIN (0)

## 2019-09-16 NOTE — PROGRESS NOTES
JAYME Webb is a 28 year old  female who presents for annual exam.   Youngest child 6 months  Largest Child 6 lb 12 oz  GDM n  Hypertension n  No LMP recorded.  Menses:  Cycles Amenorrhea with breastfeeding When did they start 12 How many days bleed na  pain na Contraception Pulls out and breastfeeding.  Planning pregnancy: n  Concerns in addition to routine health maintenance?  weight.  GYNECOLOGIC HISTORY:   Surgery: n  History sexually transmitted infections:No STD history  Safe?  y  STI testing offered?  y  History of abnormal Pap smear: n  Problems with sex? (bleeding/pain) n  Family history of: breast cancer: n   Uterine cancer: n ovarian cancer: n   colon cancer: n    HEALTH MAINTENANCE:  Cholesterol: (No results found for: CHOL History of abnormal lipids: n  Mammo: y fibroadenomas. History of abnormal Mammo:    Regular Self Breast Exams: y Calcium/Vitamin D or Vitamin: y Exercise Just starting to get back to weight lifting    TSH: (No results found for: TSH )  Pap; (  Lab Results   Component Value Date    PAP NIL 2018    )    HISTORY:  OB History    Para Term  AB Living   2 1 1 0 0 1   SAB TAB Ectopic Multiple Live Births   0 0 0 0 1      # Outcome Date GA Lbr Artemio/2nd Weight Sex Delivery Anes PTL Lv   1 Term 19 38w6d 11:11 / 00:43 3.07 kg (6 lb 12.3 oz) M Vag-Spont EPI N KALIE      Complications: GBS      Name: OMI ORDAZ      Apgar1: 9  Apgar5: 9     No past medical history on file.  No past surgical history on file.  No family history on file.  Social History     Socioeconomic History     Marital status: Single     Spouse name: None     Number of children: None     Years of education: None     Highest education level: None   Occupational History     None   Social Needs     Financial resource strain: None     Food insecurity:     Worry: None     Inability: None     Transportation needs:     Medical: None     Non-medical: None   Tobacco Use     Smoking status:  Former Smoker     Types: Cigarettes     Last attempt to quit: 1/1/2016     Years since quitting: 3.7     Smokeless tobacco: Never Used   Substance and Sexual Activity     Alcohol use: No     Drug use: Unknown     Types: Other     Comment: Drug use: No     Sexual activity: Yes     Partners: Male   Lifestyle     Physical activity:     Days per week: None     Minutes per session: None     Stress: None   Relationships     Social connections:     Talks on phone: None     Gets together: None     Attends Taoist service: None     Active member of club or organization: None     Attends meetings of clubs or organizations: None     Relationship status: None     Intimate partner violence:     Fear of current or ex partner: None     Emotionally abused: None     Physically abused: None     Forced sexual activity: None   Other Topics Concern     None   Social History Narrative     None       Current Outpatient Medications:      Prenatal Vit-Fe Fumarate-FA (PRENATAL MULTIVITAMIN PLUS IRON) 27-0.8 MG TABS per tablet, Take 1 tablet by mouth daily, Disp: , Rfl:      Allergies   Allergen Reactions     Nitrofurantoin Rash     Macrobid       Past medical, surgical, social and family history were reviewed and updated in [facility].    ROS:    CONSTITUTIONAL:     NEGATIVE for fever, chills.  Loss of weight with breastfeeding  INTEGUMENTARY/SKIN:       NEGATIVE for worrisome rashes, moles or lesions  EYES:     NEGATIVE for vision changes or irritation  ENT/MOUTH: NEGATIVE for ear, mouth and throat problems  RESP:     NEGATIVE for significant cough or SOB  CV:   NEGATIVE for chest pain, palpitations or peripheral edema  GI:     NEGATIVE for nausea, abdominal pain, heartburn, or change in bowel habits  :   NEGATIVE for frequency, dysuria, hematuria, vaginal discharge, or irregular bleeding,incontinence   MUSCULOSKELETAL:     NEGATIVE for significant arthralgias or myalgia  NEURO:      NEGATIVE for weakness, dizziness or  "paresthesias  ENDOCRINE:      NEGATIVE for temperature intolerance, skin/hair changes  PSYCHIATRIC:      NEGATIVE for changes in mood or affect.     EXAM:   BP 90/60   Pulse 64   Ht 1.651 m (5' 5\")   Wt 45.8 kg (101 lb)   BMI 16.81 kg/m     BMI: Body mass index is 16.81 kg/m .  Constitutional: healthy, alert and no distress  Head: Normocephalic. No masses, lesions, tenderness or abnormalities  Neck: Neck supple. Trachea midline. No adenopathy. Thyroid symmetric, normal size.   Cardiovascular: RRR.   Respiratory: lungs clear   Breast: Breasts reveal mild symmetric fibrocystic densities,noted firm 2 cm lump under right breast nipple.  Gastrointestinal: Abdomen soft, non-tender, non-distended. No masses, organomegaly.  Vulva:  No external lesions, normal female hair distribution, no inguinal adenopathy.    Urethra:  Midline, non-tender, well supported, no discharge  Vagina:  Moist, pink, no abnormal discharge, no lesions  Cervix: clean   Uterus:  Normal size, non-tender, freely mobile  Ovaries:  No masses appreciated  Rectal Exam: deferred  Musculoskeletal: extremities normal  Skin: no suspicious lesions or rashes  Psychiatric: Affect appropriate, cooperative,mentation appears normal.     COUNSELING:   regular exercise  healthy diet/nutrition  Immunizations   contraception  family planning  Folic Acid Counseling     reports that she quit smoking about 3 years ago. Her smoking use included cigarettes. She has never used smokeless tobacco.  Tobacco Cessation Action Plan: na  Body mass index is 16.81 kg/m .  Weight management plan: Protein shakes and additional calories and weight lifting  FRAX Risk Assessment    ASSESSMENT:  28 year old female with satisfactory annual exam  Need of cervical cancer screening  PP 5+ months  BF and pull out for birth control  Weight loss with BF  Mass noted under right breast nipple  BMI < 16  PLAN:   Pap with High Risk hpv  Tsh, basic metabolic panel  US for breast lump  Protein shakes " and additional calories intake    Return to office: 1 year for well woman care and as needed    Total visit greater than 30 minutes with 25 minutes spent discussing well woman care, health promotion, and disease prevention.    TEO Morrissey, CNM

## 2019-09-16 NOTE — NURSING NOTE
"Chief Complaint   Patient presents with     Gyn Exam       Initial BP 90/60   Pulse 64   Ht 1.651 m (5' 5\")   Wt 45.8 kg (101 lb)   BMI 16.81 kg/m   Estimated body mass index is 16.81 kg/m  as calculated from the following:    Height as of this encounter: 1.651 m (5' 5\").    Weight as of this encounter: 45.8 kg (101 lb).  Medication Reconciliation: complete   Selma Rocha LPN        "

## 2019-09-16 NOTE — PATIENT INSTRUCTIONS
Return to office in one year for well woman care and as needed.    Thank you for allowing Tom BRUCE CNM and our OB team to participate in your care.  If you have a scheduling or an appointment question please contact [facility] Unit Coordinator at their direct line [phone].   ALL nursing questions or concerns can be directed to your OB nurse at: [phone] Federico Roach/Sel[place]

## 2019-09-17 ENCOUNTER — TELEPHONE (OUTPATIENT)
Dept: OBGYN | Facility: OTHER | Age: 29
End: 2019-09-17

## 2019-09-17 ASSESSMENT — ANXIETY QUESTIONNAIRES: GAD7 TOTAL SCORE: 5

## 2019-09-19 ENCOUNTER — TELEPHONE (OUTPATIENT)
Dept: OBGYN | Facility: OTHER | Age: 29
End: 2019-09-19

## 2019-09-19 DIAGNOSIS — N63.0 LUMP OR MASS IN BREAST: Primary | ICD-10-CM

## 2019-09-24 LAB
COPATH REPORT: NORMAL
PAP: NORMAL

## 2019-10-16 ENCOUNTER — OFFICE VISIT (OUTPATIENT)
Dept: FAMILY MEDICINE | Facility: OTHER | Age: 29
End: 2019-10-16
Attending: PHYSICIAN ASSISTANT
Payer: COMMERCIAL

## 2019-10-16 ENCOUNTER — HOSPITAL ENCOUNTER (OUTPATIENT)
Dept: GENERAL RADIOLOGY | Facility: OTHER | Age: 29
Discharge: HOME | End: 2019-10-16
Attending: PHYSICIAN ASSISTANT | Admitting: PHYSICIAN ASSISTANT
Payer: COMMERCIAL

## 2019-10-16 VITALS
TEMPERATURE: 98.4 F | DIASTOLIC BLOOD PRESSURE: 62 MMHG | HEART RATE: 84 BPM | SYSTOLIC BLOOD PRESSURE: 98 MMHG | RESPIRATION RATE: 18 BRPM

## 2019-10-16 DIAGNOSIS — S66.912A MUSCLE STRAIN OF LEFT WRIST, INITIAL ENCOUNTER: Primary | ICD-10-CM

## 2019-10-16 DIAGNOSIS — M25.532 LEFT WRIST PAIN: ICD-10-CM

## 2019-10-16 PROCEDURE — 73110 X-RAY EXAM OF WRIST: CPT | Mod: LT

## 2019-10-16 PROCEDURE — 99213 OFFICE O/P EST LOW 20 MIN: CPT | Performed by: PHYSICIAN ASSISTANT

## 2019-10-16 ASSESSMENT — PAIN SCALES - GENERAL: PAINLEVEL: MILD PAIN (2)

## 2019-10-16 NOTE — LETTER
October 16, 2019      Tracey Caban  [address] [place] [address]        To Whom It May Concern:    Tracey Caban was seen in our clinic. She may return to work. Please excuse from using her left wrist with lifting and repetitive activities through 10/25/2019.       Sincerely,        Lillian Navarrete PA-C

## 2019-10-16 NOTE — PATIENT INSTRUCTIONS
Encouraged to take ibuprofen (400-600mg) for relief up to 4 times per day.  Encouraged rest and elevation.  Encouraged to use ice or heat 15 minutes at a time several times per day to decrease pain. Return to clinic in 1-2 weeks as necessary for persistent pain. Return to clinic with any change or worsening of symptoms.   Encouraged to use a wrist brace with repetitive activities and while sleeping.

## 2019-10-16 NOTE — NURSING NOTE
Chief Complaint   Patient presents with     Musculoskeletal Problem     Left         Medication Reconciliation: complete    Marli Tay, LPN

## 2019-10-16 NOTE — PROGRESS NOTES
Nursing Notes:   Marli Tay LPN  10/16/2019  3:06 PM  Signed  Chief Complaint   Patient presents with     Musculoskeletal Problem     Left         Medication Reconciliation: complete    Marli Tay LPN      HPI:    Tracey Caban is a 29 year old female who presents for left wrist pain.  Patient has recently been dealing with left wrist pain.  She feels like she has a bump on the left wrist distal radial side.  Tender to palpation.  Wakes up at night due to wrist pain.  She states that it is making her have a hard time putting on her shoes.  No known injury.  Walking sensation.  Works as a .  No known injury.  Pain is worse after increased usage and reported of activity.  Worse after picking up heavy objects such as dishes.  No numbness or tingling.  Has been using heat and massage for treatment.  Not currently using wrist braces.  No numbness or tingling in the hands.      History reviewed. No pertinent past medical history.    History reviewed. No pertinent surgical history.    History reviewed. No pertinent family history.    Social History     Tobacco Use     Smoking status: Former Smoker     Packs/day: 0.00     Types: Cigarettes     Last attempt to quit: 1/1/2016     Years since quitting: 3.8     Smokeless tobacco: Never Used   Substance Use Topics     Alcohol use: No       Current Outpatient Medications   Medication Sig Dispense Refill     Prenatal Vit-Fe Fumarate-FA (PRENATAL MULTIVITAMIN PLUS IRON) 27-0.8 MG TABS per tablet Take 1 tablet by mouth daily         Allergies   Allergen Reactions     Nitrofurantoin Rash     Macrobid       REVIEW OF SYSTEMS:  Refer to HPI.    EXAM:   Vitals:    BP 98/62 (BP Location: Right arm, Patient Position: Sitting, Cuff Size: Adult Regular)   Pulse 84   Temp 98.4  F (36.9  C)   Resp 18   Breastfeeding? Yes     General Appearance: Pleasant, alert, appropriate appearance for age. No acute distress  Musculoskeletal Exam: Pain upon  palpation of left distal lateral radius.  No bruising or swelling appreciated.  Minimal pain with left wrist flexion and extension.  No scaphoid tenderness to palpation.    Skin: no rash or abnormalities  Neurologic Exam: Nonfocal, normal gross motor, tone coordination and no tremor.  Psychiatric Exam: Alert and oriented -appropriate affect.    PHQ Depression Screen  PHQ-9 SCORE 4/15/2019 5/13/2019 9/16/2019   PHQ-9 Total Score 1 5 4       ASSESSMENT AND PLAN:      ICD-10-CM    1. Muscle strain of left wrist, initial encounter S66.912A    2. Left wrist pain M25.532 XR Wrist Left G/E 3 Views         Completed left wrist xray.  I personally reviewed the xray. I found no fracture appreciated upon initial read of xray.  Final read pending by radiology.     Likely left wrist irritation with repetitive use.  Encouraged to take ibuprofen (400-600mg) for relief up to 4 times per day.  Encouraged rest and elevation.  Encouraged to use ice or heat 15 minutes at a time several times per day to decrease pain. Return to clinic in 1-2 weeks as necessary for persistent pain. Return to clinic with any change or worsening of symptoms.   Encouraged to use a wrist brace with repetitive activities and while sleeping.  Declines receiving a left wrist brace at this time in clinic.       Patient Instructions   Encouraged to take ibuprofen (400-600mg) for relief up to 4 times per day.  Encouraged rest and elevation.  Encouraged to use ice or heat 15 minutes at a time several times per day to decrease pain. Return to clinic in 1-2 weeks as necessary for persistent pain. Return to clinic with any change or worsening of symptoms.   Encouraged to use a wrist brace with repetitive activities and while sleeping.       Lillian Navarrete PA-C PA-C..................10/16/2019 3:06 PM

## 2019-10-25 ENCOUNTER — TELEPHONE (OUTPATIENT)
Dept: OBGYN | Facility: OTHER | Age: 29
End: 2019-10-25

## 2020-06-23 ENCOUNTER — VIRTUAL VISIT (OUTPATIENT)
Dept: FAMILY MEDICINE | Facility: OTHER | Age: 30
End: 2020-06-23
Attending: FAMILY MEDICINE
Payer: COMMERCIAL

## 2020-06-23 VITALS — WEIGHT: 102 LBS | BODY MASS INDEX: 16.97 KG/M2

## 2020-06-23 DIAGNOSIS — R05.9 COUGH: Primary | ICD-10-CM

## 2020-06-23 DIAGNOSIS — Z20.822 COVID-19 RULED OUT: Primary | ICD-10-CM

## 2020-06-23 PROCEDURE — 99207 ZZC NO CHARGE NURSE ONLY: CPT

## 2020-06-23 PROCEDURE — U0003 INFECTIOUS AGENT DETECTION BY NUCLEIC ACID (DNA OR RNA); SEVERE ACUTE RESPIRATORY SYNDROME CORONAVIRUS 2 (SARS-COV-2) (CORONAVIRUS DISEASE [COVID-19]), AMPLIFIED PROBE TECHNIQUE, MAKING USE OF HIGH THROUGHPUT TECHNOLOGIES AS DESCRIBED BY CMS-2020-01-R: HCPCS | Mod: ZL | Performed by: FAMILY MEDICINE

## 2020-06-23 PROCEDURE — C9803 HOPD COVID-19 SPEC COLLECT: HCPCS

## 2020-06-23 PROCEDURE — 99212 OFFICE O/P EST SF 10 MIN: CPT | Mod: 95 | Performed by: FAMILY MEDICINE

## 2020-06-23 ASSESSMENT — PAIN SCALES - GENERAL: PAINLEVEL: MILD PAIN (3)

## 2020-06-23 NOTE — PROGRESS NOTES
"Tracey Caban is a 29 year old female who is being evaluated via a billable telephone visit.      The patient has been notified of following:     \"This telephone visit will be conducted via a call between you and your physician/provider. We have found that certain health care needs can be provided without the need for a physical exam.  This service lets us provide the care you need with a short phone conversation.  If a prescription is necessary we can send it directly to your pharmacy.  If lab work is needed we can place an order for that and you can then stop by our lab to have the test done at a later time.    Telephone visits are billed at different rates depending on your insurance coverage. During this emergency period, for some insurers they may be billed the same as an in-person visit.  Please reach out to your insurance provider with any questions.    If during the course of the call the physician/provider feels a telephone visit is not appropriate, you will not be charged for this service.\"    Patient has given verbal consent for Telephone visit?  Yes    What phone number would you like to be contacted at? [phone]    How would you like to obtain your AVS? Edson Llamas     Tracey Caban is a 29 year old female who presents via phone visit today for the following health issues:    Patient calls concerned about cough and dizziness.  Is been going on for 2 weeks.  She had called to make an appointment and was referred to a telephone visit.  She reports no exposures.  She did have chills for 1 day.  She is also reporting some chest discomfort especially with coughing.  No complaints of fevers but states that she felt some warmness.  No exposures no other members of the family are sick.                   Reviewed and updated as needed this visit by Provider         Review of Systems          Objective   Reported vitals:  There were no vitals taken for this visit.     PSYCH: Alert and oriented " times 3; coherent speech, normal   rate and volume, able to articulate logical thoughts, able   to abstract reason, no tangential thoughts, no hallucinations   or delusions  Her affect is   RESP: No cough, no audible wheezing, able to talk in full sentences  Remainder of exam unable to be completed due to telephone visits            Assessment/Plan:  1. Cough  I discussed that she meets criteria for COVID teeth testing.  We also discussed that I cannot tell her whether this is allergic bronchitis or pneumonitis in origin.  If she would like further investigation she would need to be seen.  She does request COVID testing.  We will decide about a future appointment later.- Symptomatic COVID-19 Virus (Coronavirus) by PCR    No follow-ups on file.      Phone call duration:  8 minutes    Kevin Dejesus MD

## 2020-06-23 NOTE — LETTER
June 26, 2020        Tracey Caban  [address] [facility] [address]    This letter provides a written record that you were tested for COVID-19 on 06/23/20.       Your result was negative. This means that we didn t find the virus that causes COVID-19 in your sample. A test may show negative when you do actually have the virus. This can happen when the virus is in the early stages of infection, before you feel illness symptoms.    If you have symptoms   Stay home and away from others (self-isolate) until you meet ALL of the guidelines below:    You ve had no fever--and no medicine that reduces fever--for 3 full days (72 hours). And      Your other symptoms have gotten better. For example, your cough or breathing has improved. And     At least 10 days have passed since your symptoms started.    During this time:    Stay home. Don t go to work, school or anywhere else.     Stay in your own room, including for meals. Use your own bathroom if you can.    Stay away from others in your home. No hugging, kissing or shaking hands. No visitors.    Clean  high touch  surfaces often (doorknobs, counters, handles, etc.). Use a household cleaning spray or wipes. You can find a full list on the EPA website at www.epa.gov/pesticide-registration/list-n-disinfectants-use-against-sars-cov-2.    Cover your mouth and nose with a mask, tissue or washcloth to avoid spreading germs.    Wash your hands and face often with soap and water.    Going back to work  Check with your employer for any guidelines to follow for going back to work.    Employers: This document serves as formal notice that your employee tested negative for COVID-19, as of the testing date shown above.

## 2020-06-25 LAB
SARS-COV-2 RNA SPEC QL NAA+PROBE: NOT DETECTED
SPECIMEN SOURCE: NORMAL

## 2021-05-06 NOTE — PROGRESS NOTES
"Nursing Notes:   Mary Hernandez LPN  5/7/2021  8:51 AM  Signed  Chief Complaint   Patient presents with     Physical     yearly physical, wondering about her bone density, has had a dexa scan 2 years ago     Patient presents to clinic today for routine yearly physical. She is wondering about her bone density. She notes having a DEXA scan 2 years ago.     Initial /62 (BP Location: Right arm, Patient Position: Sitting, Cuff Size: Adult Regular)   Pulse 102   Temp 97.2  F (36.2  C) (Tympanic)   Resp 16   Ht 1.66 m (5' 5.35\")   Wt 52.6 kg (116 lb)   LMP 05/05/2021   SpO2 99%   BMI 19.09 kg/m   Estimated body mass index is 19.09 kg/m  as calculated from the following:    Height as of this encounter: 1.66 m (5' 5.35\").    Weight as of this encounter: 52.6 kg (116 lb).         Medication Reconciliation: Complete      Mary Hernandez LPN       ANNUAL PHYSICAL - FEMALE    HPI: Tracey Caban who presents for a yearly exam.  Concerns include:    Patient notes she has a history of osteopenia likely secondary to use of Depo Provera per her prior OB/GYN provider. She had DEXA scans completed previously in [place] which showed osteopenia. She will have those records faxed to [facility] to review. Most recent DEXA was 4 years ago, due for repeat imaging. Has not been focusing on weight bearing exercises and consuming enough calcium and vitamin D in her diet and previously recommended.     She is also requesting that we screen for vitamin deficiencies as she is hoping to start on more vitamins and herbal supplements but wants a baseline of where her levels are at.     She is due for annual and screening labs as well.     Patient's last menstrual period was 05/04/2021.   Contraception: No  Risk for STI?: No  Last pap: 9/16/2019, negative  Any hx of abnormal paps:  No  FH of early CA?: No  Cholesterol/DM concerns/screening: Due  Tobacco?: No  Calcium intake: Dietary  DEXA: Hx of osteopenia with most recent DEXA scan 4 years " "ago, due for repeat  Last mammo: Not indicated due to patient age and health status.   Colonoscopy: Not indicated due to patient age and health status.   Immunizations: UTD    Patient Active Problem List    Diagnosis Date Noted     Well woman exam with routine gynecological exam 2019     Priority: Medium     Encounter for triage in pregnant patient 2019     Priority: Medium      (spontaneous vaginal delivery) 2019     Priority: Medium     Da Jose  Boy  Edematous perineum  2nd with repair        Encounter for supervision of other normal pregnancy in third trimester 2018     Priority: Medium     O positive  GBS POSITIVE  LV EIF on U/S   FOB:  [place]  Work:  [employer]  Step mom to 9 and 2 year old         History reviewed. No pertinent past medical history.    History reviewed. No pertinent surgical history.    History reviewed. No pertinent family history.    Social History     Tobacco Use     Smoking status: Former Smoker     Packs/day: 0.00     Types: Cigarettes     Quit date: 2016     Years since quittin.3     Smokeless tobacco: Never Used   Substance Use Topics     Alcohol use: Yes     Comment: occasionally       No current outpatient medications on file.       Allergies   Allergen Reactions     Nitrofurantoin Rash     Macrobid       REVIEW OF SYSTEMS:  Refer to HPI.    PHYSICAL EXAM:  /62 (BP Location: Right arm, Patient Position: Sitting, Cuff Size: Adult Regular)   Pulse 102   Temp 97.2  F (36.2  C) (Tympanic)   Resp 16   Ht 1.66 m (5' 5.35\")   Wt 52.6 kg (116 lb)   LMP 2021   SpO2 99%   BMI 19.09 kg/m    CONSTITUTIONAL:  Alert,cooperative, NAD.  EYES: No scleral icterus.  PERRLA.  Conjunctiva clear.  ENT/MOUTH: External ears and nose normal.  TMs normal.  Moist mucous membranes. Oropharynx clear.    ENDO: No thyromegaly or thyroidnodules.  LYMPH:  No cervical or supraclavicular LA.    CARDIOVASCULAR: Regular,S1, S2.  No S3 or S4.  No " murmur/gallop/rub.  No peripheral edema.  RESPIRATORY: CTA bilaterally, no wheezes, rhonchi or rales.  MSKEL: Grossly normal ROM.  No clubbing.  INTEGUMENTARY:  Warm, dry.  No rash noted on exposed skin.  NEUROLOGIC: Facies symmetric.  Grossly normal movement and tone.  No tremor.  PSYCHIATRIC: Affect normal.  Speech fluent.      PHQ Depression Screen  PHQ-9 SCORE 4/15/2019 5/13/2019 9/16/2019   PHQ-9 Total Score 1 5 4       Labs:  No results found for any visits on 05/07/21.    ASSESSMENT AND PLAN:      ICD-10-CM    1. Routine history and physical examination of adult  Z00.00 CBC W PLT No Diff     Basic Metabolic Panel   2. Lipid screening  Z13.220 Lipid Panel   3. Screening for diabetes mellitus  Z13.1 Hemoglobin A1c   4. Hx of osteopenia  Z87.39 DX Hip/Pelvis/Spine   5. Patient request for diagnostic testing  Z01.89 Vitamin D Total     Vitamin B12     Iron Binding Panel     Ferritin     1. Relevant cancer screening discussed.  Counseled on healthy diet, Calcium and vitamin D intake, and exercise. CBC and BMP pending. UTD on screenings and immunizations.     2. Lipid panel pending, will notify with results. Encourage healthy diet and exercise.     3. Hemoglobin A1c pending, will notify with results. Encourage healthy diet and exercise.     4. Given patient's history of osteopenia, will need recheck on bone density as it has been over 4 years since last DEXA. Order placed, patient will schedule at her convenience.     5. Vitamin and iron labs pending per patient request. Will notify with results and treat if indicated.     There are no Patient Instructions on file for this visit.     Leeann Anthony PA-C

## 2021-05-07 ENCOUNTER — OFFICE VISIT (OUTPATIENT)
Dept: FAMILY MEDICINE | Facility: OTHER | Age: 31
End: 2021-05-07
Attending: PHYSICIAN ASSISTANT
Payer: COMMERCIAL

## 2021-05-07 VITALS
TEMPERATURE: 97.2 F | SYSTOLIC BLOOD PRESSURE: 106 MMHG | RESPIRATION RATE: 16 BRPM | DIASTOLIC BLOOD PRESSURE: 62 MMHG | HEIGHT: 65 IN | HEART RATE: 102 BPM | WEIGHT: 116 LBS | BODY MASS INDEX: 19.33 KG/M2 | OXYGEN SATURATION: 99 %

## 2021-05-07 DIAGNOSIS — Z13.220 LIPID SCREENING: ICD-10-CM

## 2021-05-07 DIAGNOSIS — Z87.39 HX OF OSTEOPENIA: ICD-10-CM

## 2021-05-07 DIAGNOSIS — Z01.89 PATIENT REQUEST FOR DIAGNOSTIC TESTING: ICD-10-CM

## 2021-05-07 DIAGNOSIS — Z13.1 SCREENING FOR DIABETES MELLITUS: ICD-10-CM

## 2021-05-07 DIAGNOSIS — Z00.00 ROUTINE HISTORY AND PHYSICAL EXAMINATION OF ADULT: Primary | ICD-10-CM

## 2021-05-07 LAB
ANION GAP SERPL CALCULATED.3IONS-SCNC: 7 MMOL/L (ref 3–14)
BUN SERPL-MCNC: 19 MG/DL (ref 7–25)
CALCIUM SERPL-MCNC: 9 MG/DL (ref 8.6–10.3)
CHLORIDE SERPL-SCNC: 105 MMOL/L (ref 98–107)
CHOLEST SERPL-MCNC: 167 MG/DL
CO2 SERPL-SCNC: 27 MMOL/L (ref 21–31)
CREAT SERPL-MCNC: 0.76 MG/DL (ref 0.6–1.2)
DEPRECATED CALCIDIOL+CALCIFEROL SERPL-MC: 13.4 NG/ML
ERYTHROCYTE [DISTWIDTH] IN BLOOD BY AUTOMATED COUNT: 13.1 % (ref 10–15)
FERRITIN SERPL-MCNC: 28 NG/ML (ref 23.9–336.2)
GFR SERPL CREATININE-BSD FRML MDRD: 89 ML/MIN/{1.73_M2}
GLUCOSE SERPL-MCNC: 103 MG/DL (ref 70–105)
HBA1C MFR BLD: 5.2 % (ref 4–6)
HCT VFR BLD AUTO: 44 % (ref 35–47)
HDLC SERPL-MCNC: 65 MG/DL (ref 23–92)
HGB BLD-MCNC: 14.5 G/DL (ref 11.7–15.7)
IRON SATN MFR SERPL: 15 % (ref 20–55)
IRON SERPL-MCNC: 62 UG/DL (ref 50–212)
LDLC SERPL CALC-MCNC: 87 MG/DL
MCH RBC QN AUTO: 29.1 PG (ref 26.5–33)
MCHC RBC AUTO-ENTMCNC: 33 G/DL (ref 31.5–36.5)
MCV RBC AUTO: 88 FL (ref 78–100)
NONHDLC SERPL-MCNC: 102 MG/DL
PLATELET # BLD AUTO: 282 10E9/L (ref 150–450)
POTASSIUM SERPL-SCNC: 3.5 MMOL/L (ref 3.5–5.1)
RBC # BLD AUTO: 4.98 10E12/L (ref 3.8–5.2)
SODIUM SERPL-SCNC: 139 MMOL/L (ref 134–144)
TIBC SERPL-MCNC: 422.8 UG/DL (ref 245–400)
TRIGL SERPL-MCNC: 74 MG/DL
UIBC (UNSATURATED): 360.8 MG/DL
VIT B12 SERPL-MCNC: 798 PG/ML (ref 180–914)
WBC # BLD AUTO: 8.5 10E9/L (ref 4–11)

## 2021-05-07 PROCEDURE — 82306 VITAMIN D 25 HYDROXY: CPT | Mod: ZL | Performed by: PHYSICIAN ASSISTANT

## 2021-05-07 PROCEDURE — 83550 IRON BINDING TEST: CPT | Mod: ZL | Performed by: PHYSICIAN ASSISTANT

## 2021-05-07 PROCEDURE — 82728 ASSAY OF FERRITIN: CPT | Mod: ZL | Performed by: PHYSICIAN ASSISTANT

## 2021-05-07 PROCEDURE — 83540 ASSAY OF IRON: CPT | Mod: ZL | Performed by: PHYSICIAN ASSISTANT

## 2021-05-07 PROCEDURE — 99395 PREV VISIT EST AGE 18-39: CPT | Performed by: PHYSICIAN ASSISTANT

## 2021-05-07 PROCEDURE — 85027 COMPLETE CBC AUTOMATED: CPT | Mod: ZL | Performed by: PHYSICIAN ASSISTANT

## 2021-05-07 PROCEDURE — 80061 LIPID PANEL: CPT | Mod: ZL | Performed by: PHYSICIAN ASSISTANT

## 2021-05-07 PROCEDURE — 82607 VITAMIN B-12: CPT | Mod: ZL | Performed by: PHYSICIAN ASSISTANT

## 2021-05-07 PROCEDURE — 83036 HEMOGLOBIN GLYCOSYLATED A1C: CPT | Mod: ZL | Performed by: PHYSICIAN ASSISTANT

## 2021-05-07 PROCEDURE — 80048 BASIC METABOLIC PNL TOTAL CA: CPT | Mod: ZL | Performed by: PHYSICIAN ASSISTANT

## 2021-05-07 PROCEDURE — 36415 COLL VENOUS BLD VENIPUNCTURE: CPT | Mod: ZL | Performed by: PHYSICIAN ASSISTANT

## 2021-05-07 SDOH — HEALTH STABILITY: MENTAL HEALTH: HOW OFTEN DO YOU HAVE 6 OR MORE DRINKS ON ONE OCCASION?: NOT ASKED

## 2021-05-07 SDOH — HEALTH STABILITY: MENTAL HEALTH: HOW MANY STANDARD DRINKS CONTAINING ALCOHOL DO YOU HAVE ON A TYPICAL DAY?: NOT ASKED

## 2021-05-07 SDOH — HEALTH STABILITY: MENTAL HEALTH: HOW OFTEN DO YOU HAVE A DRINK CONTAINING ALCOHOL?: NOT ASKED

## 2021-05-07 ASSESSMENT — PAIN SCALES - GENERAL: PAINLEVEL: NO PAIN (0)

## 2021-05-07 ASSESSMENT — MIFFLIN-ST. JEOR: SCORE: 1252.66

## 2021-05-07 NOTE — LETTER
May 7, 2021      Tracey Caban  [address] [facility] [address]        Dear ,    We are writing to inform you of your test results. Your lab results look good for the most part. Your vitamin D level is deficient, I would recommend a daily supplement for this. Your iron binding was slightly elevated however your ferritin level was within normal limits suggesting you are not iron deficient. No need for iron supplements.         Resulted Orders   Ferritin   Result Value Ref Range    Ferritin 28 23.9 - 336.2 ng/mL   Iron Binding Panel   Result Value Ref Range    Iron 62 50 - 212 ug/dL    UIBC (Unsaturated) 360.80 mg/dL    Iron Binding Capacity 422.80 (H) 245.00 - 400.00 ug/dL    Iron Saturation 15 (L) 20 - 55 %   Vitamin B12   Result Value Ref Range    Vitamin B12 798 180 - 914 pg/mL   Vitamin D Total   Result Value Ref Range    Vitamin D Total 13.4 ng/mL      Comment:      Comments:  Deficiency:             <10 ng/mL  Insufficiency:        10-29 ng/mL  Sufficiency:          ng/mL  Possible Toxicity:     >100 ng/mL     Hemoglobin A1c   Result Value Ref Range    Hemoglobin A1C 5.2 4.0 - 6.0 %   Lipid Panel   Result Value Ref Range    Cholesterol 167 <200 mg/dL    Triglycerides 74 <150 mg/dL    HDL Cholesterol 65 23 - 92 mg/dL    LDL Cholesterol Calculated 87 <100 mg/dL      Comment:      Desirable:       <100 mg/dl    Non HDL Cholesterol 102 <130 mg/dL   Basic Metabolic Panel   Result Value Ref Range    Sodium 139 134 - 144 mmol/L    Potassium 3.5 3.5 - 5.1 mmol/L    Chloride 105 98 - 107 mmol/L    Carbon Dioxide 27 21 - 31 mmol/L    Anion Gap 7 3 - 14 mmol/L    Glucose 103 70 - 105 mg/dL    Urea Nitrogen 19 7 - 25 mg/dL    Creatinine 0.76 0.60 - 1.20 mg/dL    GFR Estimate 89 >60 mL/min/[1.73_m2]    GFR Estimate If Black >90 >60 mL/min/[1.73_m2]    Calcium 9.0 8.6 - 10.3 mg/dL   CBC W PLT No Diff   Result Value Ref Range    WBC 8.5 4.0 - 11.0 10e9/L    RBC Count 4.98 3.8 - 5.2 10e12/L    Hemoglobin  14.5 11.7 - 15.7 g/dL    Hematocrit 44.0 35.0 - 47.0 %    MCV 88 78 - 100 fl    MCH 29.1 26.5 - 33.0 pg    MCHC 33.0 31.5 - 36.5 g/dL    RDW 13.1 10.0 - 15.0 %    Platelet Count 282 150 - 450 10e9/L       If you have any questions or concerns, please call the clinic at the number listed above.       Sincerely,      Leeann Anthony PA-C

## 2021-05-13 ENCOUNTER — HOSPITAL ENCOUNTER (OUTPATIENT)
Dept: BONE DENSITY | Facility: OTHER | Age: 31
Discharge: HOME | End: 2021-05-13
Attending: PHYSICIAN ASSISTANT | Admitting: PHYSICIAN ASSISTANT
Payer: COMMERCIAL

## 2021-05-13 DIAGNOSIS — Z87.39 HX OF OSTEOPENIA: ICD-10-CM

## 2021-05-13 PROCEDURE — 77080 DXA BONE DENSITY AXIAL: CPT

## 2021-05-21 ENCOUNTER — TELEPHONE (OUTPATIENT)
Dept: FAMILY MEDICINE | Facility: OTHER | Age: 31
End: 2021-05-21

## 2021-05-21 NOTE — TELEPHONE ENCOUNTER
Notified patient of providers note.  Lorrie Garces LPN ....................  5/21/2021   11:54 AM

## 2021-05-21 NOTE — TELEPHONE ENCOUNTER
Reason for call: Request for results.    Name of test or procedure: Dexa Scan    Date of test or procedure: 5/13    Location of test or procedure: [facility]    Preferred method for responding to this message: Telephone Call    Phone number patient can be reached at: Cell number on file:    Telephone Information:   Mobile [phone]       If we can't reach you directly, may we leave a detailed response at the number you provided?Yes

## 2021-05-21 NOTE — TELEPHONE ENCOUNTER
I do not have the final read by the radiologist for the DEXA scan as of yet. Once the results are received, I will send them to patient on [employer].   Leeann Anthony PA-C on 5/21/2021 at 11:42 AM

## 2021-10-09 ENCOUNTER — HEALTH MAINTENANCE LETTER (OUTPATIENT)
Age: 31
End: 2021-10-09

## 2021-12-01 ENCOUNTER — ALLIED HEALTH/NURSE VISIT (OUTPATIENT)
Dept: FAMILY MEDICINE | Facility: OTHER | Age: 31
End: 2021-12-01
Attending: FAMILY MEDICINE
Payer: COMMERCIAL

## 2021-12-01 DIAGNOSIS — M79.10 MUSCLE PAIN: ICD-10-CM

## 2021-12-01 DIAGNOSIS — J02.9 SORE THROAT: ICD-10-CM

## 2021-12-01 DIAGNOSIS — Z20.822 COVID-19 RULED OUT: Primary | ICD-10-CM

## 2021-12-01 PROCEDURE — U0003 INFECTIOUS AGENT DETECTION BY NUCLEIC ACID (DNA OR RNA); SEVERE ACUTE RESPIRATORY SYNDROME CORONAVIRUS 2 (SARS-COV-2) (CORONAVIRUS DISEASE [COVID-19]), AMPLIFIED PROBE TECHNIQUE, MAKING USE OF HIGH THROUGHPUT TECHNOLOGIES AS DESCRIBED BY CMS-2020-01-R: HCPCS | Mod: ZL

## 2021-12-01 PROCEDURE — C9803 HOPD COVID-19 SPEC COLLECT: HCPCS

## 2021-12-01 NOTE — PROGRESS NOTES
Patient swabbed for COVID-19 testing.  Partner tested positive home test  Kristy Nichole MA on 12/1/2021 at 10:58 AM

## 2021-12-02 LAB — SARS-COV-2 RNA RESP QL NAA+PROBE: NEGATIVE

## 2021-12-04 ENCOUNTER — OFFICE VISIT (OUTPATIENT)
Dept: FAMILY MEDICINE | Facility: OTHER | Age: 31
End: 2021-12-04
Attending: PHYSICIAN ASSISTANT
Payer: COMMERCIAL

## 2021-12-04 VITALS
OXYGEN SATURATION: 98 % | WEIGHT: 113.5 LBS | TEMPERATURE: 101.2 F | BODY MASS INDEX: 18.68 KG/M2 | DIASTOLIC BLOOD PRESSURE: 82 MMHG | SYSTOLIC BLOOD PRESSURE: 126 MMHG | RESPIRATION RATE: 18 BRPM | HEART RATE: 92 BPM

## 2021-12-04 DIAGNOSIS — Z20.822 SUSPECTED 2019 NOVEL CORONAVIRUS INFECTION: ICD-10-CM

## 2021-12-04 DIAGNOSIS — R05.9 COUGH: Primary | ICD-10-CM

## 2021-12-04 PROCEDURE — U0005 INFEC AGEN DETEC AMPLI PROBE: HCPCS | Mod: ZL | Performed by: PHYSICIAN ASSISTANT

## 2021-12-04 PROCEDURE — C9803 HOPD COVID-19 SPEC COLLECT: HCPCS

## 2021-12-04 PROCEDURE — 99213 OFFICE O/P EST LOW 20 MIN: CPT | Performed by: PHYSICIAN ASSISTANT

## 2021-12-04 PROCEDURE — G0463 HOSPITAL OUTPT CLINIC VISIT: HCPCS

## 2021-12-04 ASSESSMENT — PAIN SCALES - GENERAL: PAINLEVEL: SEVERE PAIN (6)

## 2021-12-04 NOTE — PATIENT INSTRUCTIONS
"If a COVID swab was performed:   Please quarantine until results return.     -If \"NEGATIVE\" and symptoms improving (without need for medication) you are able to return to work/activities of daily limit    -If \"POSITIVE\" please quarantine for 10-14 days from symptom onset    Please refer to your AVS for follow up and pain/symptoms management recommendations (I.e.: medications, helpful conservative treatment modalities, appropriate follow up if need to a specialist or family practice, etc.). Please return to urgent care if your symptoms change or worsen.     Discharge instructions:  -If you were prescribed a medication(s), please take this as prescribed/directed  -Monitor your symptoms, if changing/worsening, return to UC/ER or PCP for follow up    Symptomatic treatments recommended.  - Antibiotics will not help with your symptoms, unless you were told otherwise today (strep throat, ear infection, etc. ). Education provided on symptoms of secondary bacterial infection such as new fever, chills, rigors, shortness of breath, increased work of breathing, that can occur with viral URI and need for further evaluation, if they occur.   - Ensure you are staying hydrated by drinking plenty of fluids and eating mild foods and advance diet as tolerated  - Honey can be soothing for sore throat (as long as above 12 months of age)  - Warm salt water gurgles can help soothe sore throat  - Humidifier can help with congestion and help keep mucus membranes such as throat and nose from drying out.  - Sleeping slightly propped up can help with congestion and postnasal drainage that can worsen cough at bedtime.  - As long as you have never been told to take Tylenol and/or Ibuprofen you can use them to manage fever and body aches per package instructions  Make sure you eat when you take ibuprofen to avoid stomach upset.  - OTC cough medications per package instructions to help with cough. Check to see if the cough/cold medication already " has acetaminophen (Tylenol) in it. If it does avoid taking additional Tylenol.  - If sudden onset of new fever, worsening symptoms return for further evaluation.  - OTC antihistamine such as Allegra, Zyrtec, Claritin (generic is okay) can help with nasal/sinus congestion and OTC nasal steroid such as Flonase can help decrease sinus inflammation to help with congestion.  - Education provided on symptoms of post-viral bacterial infections including ear infection and pneumonia. This would require re-evaluation for treatment.

## 2021-12-04 NOTE — NURSING NOTE
"Chief Complaint   Patient presents with     Shortness of Breath     She was covid tested on Wednesday with her family. Her family members did test posisitive. She got symptoms the day after she was tested. She got shortness of breath, fever, cough.     Initial There were no vitals taken for this visit. Estimated body mass index is 19.09 kg/m  as calculated from the following:    Height as of 5/7/21: 1.66 m (5' 5.35\").    Weight as of 5/7/21: 52.6 kg (116 lb).     FOOD SECURITY SCREENING QUESTIONS  Hunger Vital Signs:  Within the past 12 months we worried whether our food would run out before we got money to buy more. Never  Within the past 12 months the food we bought just didn't last and we didn't have money to get more. Never      Medication Reconciliation: Complete      Mauricio Stern LPN   "

## 2021-12-04 NOTE — PROGRESS NOTES
ASSESSMENT/PLAN:    I have reviewed the nursing notes.  I have reviewed the findings, diagnosis, plan and need for follow up with the patient.    1. Suspected 2019 novel coronavirus infection  2. Cough  - Symptomatic COVID-19 Virus (Coronavirus) by PCR Nose  - Vital signs stable. PE consistent with URI. COVID test was performed, recommend that patient remain in quarantine until results return, which typically takes 24-72 hours. If COVID testing is negative, symptoms are improving (no need for antipyretics, etc.), that patient can return to normal ADLs, if COVID test returns positive, recommend quarantine for 10-14 days from symptom onset. Recommend: increased fluids, rest, use of humidifier, antitussives (cough medication for adults), alternating tylenol and ibuprofen every 4-6 hours as needed (if able to take these medications), salt water gargles for sore throats or throat lozenges, honey, netti pots/saline rinses for sinus/congestion, as well as other home remedies.  If worsening fevers, chills, uncontrollable nausea/vomiting, dehydration,etc., or other worsening signs occur, patient is agreeable to follow up for reevaluation.     Patient is in agreement and understanding of the above treatment plan. All questions and concerns were addressed and answered to patient's satisfaction. AVS reviewed with patient.     Discussed warning signs/symptoms indicative of need to f/u    Follow up if symptoms persist or worsen or concerns    I explained my diagnostic considerations and recommendations to the patient, who voiced understanding and agreement with the treatment plan. All questions were answered. We discussed potential side effects of any prescribed or recommended therapies, as well as expectations for response to treatments.    Yakelin Bloom PA-C  12/4/2021  11:09 AM    HPI:    Tracey Smith is a 31 year old female  who presents to [facility] today for concerns of URI, x a few days     Symptoms:  Yes fevers  or chills. Fever, highest reported temperature: 102 F  Yes sore throat/pharyngitis/tonsillitis.   Yes allergy/URI Symptoms  No Muffled Sounds/Change in Hearing  No Sensation of Fullness in Ear(s)  No Ringing in Ears/Tinnitus  No Balance Changes  No Dizziness  Yes Congestion (head/nasal/chest)  Yes Cough/Productive Cough - production - phlegm like  Yes Post Nasal Drip - color: clear  Yes Headache - frontal headache  No Sinus Pain/Pressure  No Myalgias  No Otalgia  Activity Level Changes: Yes: fatigue  Appetite/Liquid Intake Changes: Yes: OK intake  Changes to Bowel Habits: Yes: looser stools  Changes to Bladder Habits: No  Additional Symptoms to Report: Yes: nausea  History of similar symptoms: No  Prior workup: No    Treatments tried: Tylenol/Ibuprofen, Fluids, Rest and cough/cold medicine (nyquil)    Site of exposure: family  Type of exposure: COVID positive (2 days prior)    PCP: DONAL Anthony    History reviewed. No pertinent past medical history.  History reviewed. No pertinent surgical history.  Social History     Tobacco Use     Smoking status: Former Smoker     Packs/day: 0.00     Types: Cigarettes     Quit date: 2016     Years since quittin.9     Smokeless tobacco: Never Used   Substance Use Topics     Alcohol use: Yes     Comment: occasionally     No current outpatient medications on file.     Allergies   Allergen Reactions     Nitrofurantoin Rash     Macrobid     Past medical history, past surgical history, current medications and allergies reviewed and accurate to the best of my knowledge.      ROS:  Refer to HPI    /82   Pulse 92   Temp (!) 101.2  F (38.4  C) (Tympanic)   Resp 18   Wt 51.5 kg (113 lb 8 oz)   LMP  (LMP Unknown)   SpO2 98%   BMI 18.68 kg/m      EXAM:  General Appearance: Well appearing 31 year old female, appropriate appearance for age. No acute distress   Ears: Left TM intact, translucent with bony landmarks appreciated, no erythema, no effusion, no bulging, no  purulence.  Right TM intact, translucent with bony landmarks appreciated, no erythema, no effusion, no bulging, no purulence.  Left auditory canal clear.  Right auditory canal clear.  Normal external ears, non tender.  Eyes: conjunctivae normal without erythema or irritation, corneas clear, no drainage or crusting, no eyelid swelling, pupils equal   Orophayrnx: moist mucous membranes, posterior pharynx without erythema, tonsils symmetric, no erythema, no exudates or petechiae, pronounced clear post nasal drip, no trismus, voice clear.    Sinuses:  No sinus tenderness upon palpation of the frontal or maxillary sinuses  Nose:  Bilateral nares: no erythema, no edema, no drainage or congestion   Neck: supple without adenopathy  Respiratory: normal chest wall and respirations.  Normal effort.  Clear to auscultation bilaterally, no wheezing, crackles or rhonchi.  No increased work of breathing.  Dry cough.   Cardiac: RRR with no murmurs  Dermatological: no rashes noted of exposed skin  Psychological: normal affect, alert, oriented, and pleasant.     Labs:  COVID in process    Xray:  None

## 2021-12-05 LAB — SARS-COV-2 RNA RESP QL NAA+PROBE: POSITIVE

## 2022-01-06 ENCOUNTER — OFFICE VISIT (OUTPATIENT)
Dept: FAMILY MEDICINE | Facility: OTHER | Age: 32
End: 2022-01-06
Attending: NURSE PRACTITIONER
Payer: COMMERCIAL

## 2022-01-06 VITALS
HEART RATE: 80 BPM | BODY MASS INDEX: 18.77 KG/M2 | DIASTOLIC BLOOD PRESSURE: 62 MMHG | SYSTOLIC BLOOD PRESSURE: 100 MMHG | RESPIRATION RATE: 16 BRPM | TEMPERATURE: 97.6 F | OXYGEN SATURATION: 97 % | WEIGHT: 114 LBS

## 2022-01-06 DIAGNOSIS — N93.9 EPISODE OF HEAVY VAGINAL BLEEDING: ICD-10-CM

## 2022-01-06 DIAGNOSIS — N92.6 MISSED PERIOD: Primary | ICD-10-CM

## 2022-01-06 LAB
B-HCG SERPL-ACNC: 1 IU/L
HCG SERPL QL: NEGATIVE

## 2022-01-06 PROCEDURE — G0463 HOSPITAL OUTPT CLINIC VISIT: HCPCS | Mod: 25

## 2022-01-06 PROCEDURE — 36415 COLL VENOUS BLD VENIPUNCTURE: CPT | Mod: ZL | Performed by: NURSE PRACTITIONER

## 2022-01-06 PROCEDURE — 84703 CHORIONIC GONADOTROPIN ASSAY: CPT | Mod: ZL | Performed by: NURSE PRACTITIONER

## 2022-01-06 PROCEDURE — G0463 HOSPITAL OUTPT CLINIC VISIT: HCPCS

## 2022-01-06 PROCEDURE — 99213 OFFICE O/P EST LOW 20 MIN: CPT | Performed by: NURSE PRACTITIONER

## 2022-01-06 PROCEDURE — 84702 CHORIONIC GONADOTROPIN TEST: CPT | Mod: ZL | Performed by: NURSE PRACTITIONER

## 2022-01-06 RX ORDER — PERPHENAZINE 2 MG
1 TABLET ORAL DAILY
COMMUNITY
Start: 2022-01-06

## 2022-01-06 ASSESSMENT — PAIN SCALES - GENERAL: PAINLEVEL: NO PAIN (0)

## 2022-01-06 NOTE — NURSING NOTE
"Patient presents to the clinic for was 1 week late with menses, then had a full day of bleeding with tissue.    FOOD SECURITY SCREENING QUESTIONS:    The next two questions are to help us understand your food security.  If you are feeling you need any assistance in this area, we have resources available to support you today.    Hunger Vital Signs:  Within the past 12 months we worried whether our food would run out before we got money to buy more. Never  Within the past 12 months the food we bought just didn't last and we didn't have money to get more. Never    Advance Care Directive on file? no  Advance Care Directive provided to patient? Yes.    Chief Complaint   Patient presents with     Vaginal Problem       Initial /62 (BP Location: Right arm, Patient Position: Sitting, Cuff Size: Adult Regular)   Pulse 80   Temp 97.6  F (36.4  C) (Tympanic)   Resp 16   Wt 51.7 kg (114 lb)   LMP 01/01/2022   SpO2 97%   Breastfeeding No   BMI 18.77 kg/m   Estimated body mass index is 18.77 kg/m  as calculated from the following:    Height as of 5/7/21: 1.66 m (5' 5.35\").    Weight as of this encounter: 51.7 kg (114 lb).  Medication Reconciliation: complete        Anne Gaspar LPN       "

## 2022-01-06 NOTE — PROGRESS NOTES
HPI:    Tracey Smith is a 31 year old female who presents to clinic today for menses concerns.  She reports that her menses was about 1 week late.  She had a lot of cramping and breast tenderness and was concerned that she might been pregnant.  She then developed a lot of cramping and had a very heavy menses for about a day and a half with clots and tissue.  This stopped but then has had some light pink spotting since.  She did do home pregnancy test which is negative.  She is not on any birth control as they are considering trying to get pregnant.  She is wondering if maybe she was having a miscarriage.    History reviewed. No pertinent past medical history.      Current Outpatient Medications   Medication Sig Dispense Refill     Multiple Vitamins-Minerals (LUTEIN-ZEAXANTHIN) TABS Take 1 tablet by mouth daily 20 mg-4 mg       OVER-THE-COUNTER 1 Tab po every day of Protandim OTC       OVER-THE-COUNTER Omega 3 Plus (vitamin D, omega 3, omega 7), 2 capsule po every day/         Allergies   Allergen Reactions     Nitrofurantoin Rash     Macrobid       ROS:  Pertinent positives and negatives are noted in HPI.    EXAM:  /62 (BP Location: Right arm, Patient Position: Sitting, Cuff Size: Adult Regular)   Pulse 80   Temp 97.6  F (36.4  C) (Tympanic)   Resp 16   Wt 51.7 kg (114 lb)   LMP 01/01/2022   SpO2 97%   Breastfeeding No   BMI 18.77 kg/m    General appearance: well appearing female, in no acute distress  Psychological: normal affect, alert and pleasant  Results for orders placed or performed in visit on 01/06/22   HCG quantitative pregnancy     Status: None   Result Value Ref Range    hCG Quantitative 1 IU/L   Pregnancy, Serum (HCG)     Status: Normal   Result Value Ref Range    hCG Serum Qualitative Negative Negative         ASSESSMENT AND PLAN:    1. Missed period    2. Episode of heavy vaginal bleeding        Urine pregnancy and quantitative hCG were obtained.  These were negative with no signs of  recent pregnancy.  Patient was notified of results.  Plan follow-up as needed.    TEO Blood CNP..................1/6/2022 1:52 PM

## 2022-07-16 ENCOUNTER — HEALTH MAINTENANCE LETTER (OUTPATIENT)
Age: 32
End: 2022-07-16

## 2022-09-17 ENCOUNTER — HEALTH MAINTENANCE LETTER (OUTPATIENT)
Age: 32
End: 2022-09-17

## 2023-01-16 NOTE — PROGRESS NOTES
SUBJECTIVE:   CC: Virginia is an 32 year old who presents for preventive health visit.     Healthy Habits:     Getting at least 3 servings of Calcium per day:  Yes    Bi-annual eye exam:  Yes    Dental care twice a year:  NO    Sleep apnea or symptoms of sleep apnea:  None    Diet:  Gluten-free/reduced    Frequency of exercise:  2-3 days/week    Duration of exercise:  45-60 minutes    Taking medications regularly:  Yes    Medication side effects:  None    PHQ-2 Total Score: 1    Additional concerns today:  No    Here for annual physical.  Reports a history of breast masses previously.  Patient reports initially diagnosed with mass in left breast when she was 16.  Biopsy revealed fibroadenoma.  Reports she had been regularly screening with ultrasounds and had been diagnosed with additional fibroadenomas to her bilateral breast.  Reports it has been greater than 5 years since she has followed up and would like follow-up imaging completed.  They are not painful or bothersome, have not been growing or changing.      Contraception: None  Risk for STI?: No  Last pap: 09/2019, NIL  Any hx of abnormal paps:  No  FH of early CA?: No  Cholesterol/DM concerns/screening: Updated a few years ago, declines updating today  Tobacco?: no  Calcium intake: Dietary  DEXA: History of osteopenia with most recent DEXA completed 5/2021- results within expected range for age  Last mammo: Not indicated based on patient age and health status.   Colonoscopy: Not indicated based on patient age and health status.   Immunizations: COVID, influenza- declines      Today's PHQ-2 Score:   PHQ-2 ( 1999 Pfizer) 1/17/2023   Q1: Little interest or pleasure in doing things 0   Q2: Feeling down, depressed or hopeless 1   PHQ-2 Score 1   PHQ-2 Total Score (12-17 Years)- Positive if 3 or more points; Administer PHQ-A if positive -   Q1: Little interest or pleasure in doing things Not at all   Q2: Feeling down, depressed or hopeless Several days   PHQ-2  Score 1           Social History     Tobacco Use     Smoking status: Former     Packs/day: 0.00     Types: Cigarettes     Quit date: 2016     Years since quittin.0     Smokeless tobacco: Never   Substance Use Topics     Alcohol use: Yes     Comment: occasionally         Reviewed orders with patient.  Reviewed health maintenance and updated orders accordingly - Yes      Breast Cancer Screening:  Any new diagnosis of family breast, ovarian, or bowel cancer? No    FHS-7: No flowsheet data found.    Patient under 40 years of age: Routine Mammogram Screening not recommended.   Pertinent mammograms are reviewed under the imaging tab.    History of abnormal Pap smear:   Last 3 Pap and HPV Results:   PAP / HPV 2019   PAP (Historical) NIL NIL     PAP / HPV 2019   PAP (Historical) NIL NIL     Reviewed and updated as needed this visit by clinical staff   Tobacco  Allergies  Meds  Problems  Med Hx  Surg Hx  Fam Hx  Soc   Hx        Reviewed and updated as needed this visit by Provider   Tobacco  Allergies  Meds  Problems  Med Hx  Surg Hx  Fam Hx         History reviewed. No pertinent past medical history.   History reviewed. No pertinent surgical history.  OB History    Para Term  AB Living   2 1 1 0 0 1   SAB IAB Ectopic Multiple Live Births   0 0 0 0 1      # Outcome Date GA Lbr Artemio/2nd Weight Sex Delivery Anes PTL Lv   1 Term 19 38w6d 11:11 / 00:43 3.07 kg (6 lb 12.3 oz) M Vag-Spont EPI N KALIE      Complications: GBS      Name: OMI ORDAZ      Apgar1: 9  Apgar5: 9       Review of Systems   Constitutional: Negative for chills and fever.   HENT: Negative for congestion, ear pain, hearing loss and sore throat.    Eyes: Negative for pain and visual disturbance.   Respiratory: Negative for cough and shortness of breath.    Cardiovascular: Negative for chest pain, palpitations and peripheral edema.   Gastrointestinal: Negative for abdominal pain,  "constipation, diarrhea, heartburn, hematochezia and nausea.   Breasts:  Positive for breast mass. Negative for tenderness and discharge.   Genitourinary: Negative for dysuria, frequency, genital sores, hematuria, pelvic pain, urgency, vaginal bleeding and vaginal discharge.   Musculoskeletal: Negative for arthralgias, joint swelling and myalgias.   Skin: Negative for rash.   Neurological: Negative for dizziness, weakness, headaches and paresthesias.   Psychiatric/Behavioral: Negative for mood changes. The patient is not nervous/anxious.         OBJECTIVE:   /58   Pulse 86   Temp 97.1  F (36.2  C)   Resp 12   Ht 1.638 m (5' 4.5\")   Wt 49.3 kg (108 lb 9.6 oz)   LMP 01/05/2023   SpO2 97%   BMI 18.35 kg/m    Physical Exam  GENERAL: healthy, alert and no distress  EYES: Eyes grossly normal to inspection, PERRL and conjunctivae and sclerae normal  HENT: ear canals and TM's normal, nose and mouth without ulcers or lesions  NECK: no adenopathy, no asymmetry, masses, or scars and thyroid normal to palpation  RESP: lungs clear to auscultation - no rales, rhonchi or wheezes  BREAST: Palpable lump to right upper outer quadrant that is non painful. Similar lump under right nipple region. No overlying skin changes, nipple retraction or discharge, axillary of supraclavicular lymphadenopathy.   CV: regular rate and rhythm, normal S1 S2, no S3 or S4, no murmur, click or rub, no peripheral edema and peripheral pulses strong  Genitourinary Exam Female: External genitalia, vulva and vagina are without lesions, masses, or ulcerations. Speculum exam reveals normal appearing cervix with moderate amount of white discharge. Bimanual exam reveals normal uterus and adnexa with no masses, nontender urethra and bladder. No cervical motion tenderness. Pap smear obtained.   MS: no gross musculoskeletal defects noted, no edema  SKIN: no suspicious lesions or rashes  NEURO: Normal strength and tone, mentation intact and speech " normal  PSYCH: mentation appears normal, affect normal/bright    Diagnostic Test Results:  none     ASSESSMENT/PLAN:       ICD-10-CM    1. Routine history and physical examination of adult  Z00.00       2. Cervical cancer screening  Z12.4 Pap Screen with HPV - recommended age 30 - 65 years      3. Mass of breast, unspecified laterality  N63.0 US Breast Left Limited 1-3 Quadrants     US Breast Right Limited 1-3 Quadrants     MA Diagnostic Bilateral w/Shyam        1.  Pap smear updated, otherwise up-to-date on preventative exams.  Pap are completed in 2021 with stable.  Patient declines updating today.  Declines updating immunizations today.  Encourage continued healthy lifestyle.    2.  Pap smear obtained.  Moderate amount of discharge that was attempted to be cleaned with swabs but could be affecting pap smear results. Will notify with results.     3.  Patient reports history of multiple benign fibroadenomas that have been followed regularly with ultrasounds.  Has been 5+ years since follow-up and she would like to update imaging.  Bilateral breast ultrasound and bilateral diagnostic mammogram order placed.    Patient has been advised of split billing requirements and indicates understanding: Yes      COUNSELING:  Reviewed preventive health counseling, as reflected in patient instructions        She reports that she quit smoking about 7 years ago. Her smoking use included cigarettes. She has never used smokeless tobacco.      Leeann Anthony PA-C  [facility]

## 2023-01-17 ENCOUNTER — OFFICE VISIT (OUTPATIENT)
Dept: FAMILY MEDICINE | Facility: OTHER | Age: 33
End: 2023-01-17
Attending: PHYSICIAN ASSISTANT
Payer: COMMERCIAL

## 2023-01-17 VITALS
TEMPERATURE: 97.1 F | HEART RATE: 86 BPM | OXYGEN SATURATION: 97 % | RESPIRATION RATE: 12 BRPM | DIASTOLIC BLOOD PRESSURE: 58 MMHG | WEIGHT: 108.6 LBS | HEIGHT: 65 IN | BODY MASS INDEX: 18.09 KG/M2 | SYSTOLIC BLOOD PRESSURE: 106 MMHG

## 2023-01-17 DIAGNOSIS — Z00.00 ROUTINE HISTORY AND PHYSICAL EXAMINATION OF ADULT: Primary | ICD-10-CM

## 2023-01-17 DIAGNOSIS — N63.0 MASS OF BREAST, UNSPECIFIED LATERALITY: ICD-10-CM

## 2023-01-17 DIAGNOSIS — Z12.4 CERVICAL CANCER SCREENING: ICD-10-CM

## 2023-01-17 PROCEDURE — 87624 HPV HI-RISK TYP POOLED RSLT: CPT | Mod: ZL | Performed by: PHYSICIAN ASSISTANT

## 2023-01-17 PROCEDURE — 99395 PREV VISIT EST AGE 18-39: CPT | Performed by: PHYSICIAN ASSISTANT

## 2023-01-17 PROCEDURE — G0463 HOSPITAL OUTPT CLINIC VISIT: HCPCS

## 2023-01-17 PROCEDURE — G0123 SCREEN CERV/VAG THIN LAYER: HCPCS | Performed by: PHYSICIAN ASSISTANT

## 2023-01-17 ASSESSMENT — ENCOUNTER SYMPTOMS
CHILLS: 0
CONSTIPATION: 0
PARESTHESIAS: 0
HEMATURIA: 0
BREAST MASS: 1
FEVER: 0
NERVOUS/ANXIOUS: 0
SHORTNESS OF BREATH: 0
EYE PAIN: 0
WEAKNESS: 0
DIARRHEA: 0
JOINT SWELLING: 0
SORE THROAT: 0
ARTHRALGIAS: 0
ABDOMINAL PAIN: 0
PALPITATIONS: 0
DYSURIA: 0
MYALGIAS: 0
DIZZINESS: 0
NAUSEA: 0
HEARTBURN: 0
COUGH: 0
HEMATOCHEZIA: 0
HEADACHES: 0
FREQUENCY: 0

## 2023-01-17 ASSESSMENT — PAIN SCALES - GENERAL: PAINLEVEL: NO PAIN (0)

## 2023-01-17 NOTE — NURSING NOTE
Patient presents to clinic for annual physical.  Lorrie Garces LPN ....................  1/17/2023   8:29 AM

## 2023-01-19 LAB
BKR LAB AP GYN ADEQUACY: NORMAL
BKR LAB AP GYN INTERPRETATION: NORMAL
BKR LAB AP HPV REFLEX: NORMAL
BKR LAB AP PREVIOUS ABNORMAL: NORMAL
PATH REPORT.COMMENTS IMP SPEC: NORMAL
PATH REPORT.COMMENTS IMP SPEC: NORMAL
PATH REPORT.RELEVANT HX SPEC: NORMAL

## 2023-01-24 LAB
HUMAN PAPILLOMA VIRUS 16 DNA: NEGATIVE
HUMAN PAPILLOMA VIRUS 18 DNA: NEGATIVE
HUMAN PAPILLOMA VIRUS FINAL DIAGNOSIS: NORMAL
HUMAN PAPILLOMA VIRUS OTHER HR: NEGATIVE

## 2023-02-10 ENCOUNTER — HOSPITAL ENCOUNTER (OUTPATIENT)
Dept: MAMMOGRAPHY | Facility: OTHER | Age: 33
Discharge: HOME | End: 2023-02-10
Attending: PHYSICIAN ASSISTANT
Payer: COMMERCIAL

## 2023-02-10 ENCOUNTER — HOSPITAL ENCOUNTER (OUTPATIENT)
Dept: ULTRASOUND IMAGING | Facility: OTHER | Age: 33
Discharge: HOME | End: 2023-02-10
Attending: PHYSICIAN ASSISTANT
Payer: COMMERCIAL

## 2023-02-10 DIAGNOSIS — N63.0 MASS OF BREAST, UNSPECIFIED LATERALITY: ICD-10-CM

## 2023-02-10 PROCEDURE — 77062 BREAST TOMOSYNTHESIS BI: CPT

## 2023-02-10 PROCEDURE — 76642 ULTRASOUND BREAST LIMITED: CPT | Mod: 50

## 2024-02-24 ENCOUNTER — HEALTH MAINTENANCE LETTER (OUTPATIENT)
Age: 34
End: 2024-02-24

## 2024-12-03 ENCOUNTER — OFFICE VISIT (OUTPATIENT)
Dept: FAMILY MEDICINE | Facility: OTHER | Age: 34
End: 2024-12-03
Attending: FAMILY MEDICINE
Payer: COMMERCIAL

## 2024-12-03 VITALS
TEMPERATURE: 97.5 F | OXYGEN SATURATION: 99 % | DIASTOLIC BLOOD PRESSURE: 64 MMHG | HEIGHT: 65 IN | BODY MASS INDEX: 19.06 KG/M2 | RESPIRATION RATE: 12 BRPM | HEART RATE: 68 BPM | SYSTOLIC BLOOD PRESSURE: 110 MMHG | WEIGHT: 114.4 LBS

## 2024-12-03 DIAGNOSIS — U07.1 COVID-19: ICD-10-CM

## 2024-12-03 DIAGNOSIS — J01.90 ACUTE SINUSITIS WITH SYMPTOMS > 10 DAYS: Primary | ICD-10-CM

## 2024-12-03 ASSESSMENT — PAIN SCALES - GENERAL: PAINLEVEL_OUTOF10: EXTREME PAIN (8)

## 2024-12-03 NOTE — NURSING NOTE
"Chief Complaint   Patient presents with    Sinus Problem     Symptoms started 11/23/24 - Congestion, runny nose, aches    positive Covid 11/26/24  Negative Covid 12/2/24 - facial/sinus pressure, pain, itching, headache       Initial /64 (BP Location: Right arm, Patient Position: Sitting, Cuff Size: Adult Regular)   Pulse 68   Temp 97.5  F (36.4  C) (Temporal)   Resp 12   Ht 1.638 m (5' 4.5\")   Wt 51.9 kg (114 lb 6.4 oz)   LMP 11/26/2024 (Exact Date)   SpO2 99%   Breastfeeding No   BMI 19.33 kg/m   Estimated body mass index is 19.33 kg/m  as calculated from the following:    Height as of this encounter: 1.638 m (5' 4.5\").    Weight as of this encounter: 51.9 kg (114 lb 6.4 oz).    Medication Review: complete      Health Care Directive:  Patient does not have a Health Care Directive: Discussed advance care planning with patient; however, patient declined at this time.    Missy Boyer LPN      "

## 2024-12-03 NOTE — PATIENT INSTRUCTIONS

## 2024-12-03 NOTE — PROGRESS NOTES
"    Assessment & Plan       ICD-10-CM    1. Acute sinusitis with symptoms > 10 days  J01.90 amoxicillin-clavulanate (AUGMENTIN) 875-125 MG tablet      2. COVID-19  U07.1           Acute sinusitis is complication of COVID infection.  Should be treated Augmentin twice daily for 7 days.  We discussed other appropriate symptomatic care including over-the-counter analgesics, nasal saline rinses.  If symptoms persist/worsen, return for follow-up visit.  She has declined COVID vaccinations.      PDMP Review       None            SERGE ROCHA MD  [facility]    [facility]   Tracey Smith is a 34 year old female  presenting for the following health issues: Nursing Notes:   Missy Boyer LPN  12/3/2024  3:55 PM  Signed  Chief Complaint   Patient presents with    Sinus Problem     Symptoms started 11/23/24 - Congestion, runny nose, aches    positive Covid 11/26/24  Negative Covid 12/2/24 - facial/sinus pressure, pain, itching, headache       Initial /64 (BP Location: Right arm, Patient Position: Sitting, Cuff Size: Adult Regular)   Pulse 68   Temp 97.5  F (36.4  C) (Temporal)   Resp 12   Ht 1.638 m (5' 4.5\")   Wt 51.9 kg (114 lb 6.4 oz)   LMP 11/26/2024 (Exact Date)   SpO2 99%   Breastfeeding No   BMI 19.33 kg/m   Estimated body mass index is 19.33 kg/m  as calculated from the following:    Height as of this encounter: 1.638 m (5' 4.5\").    Weight as of this encounter: 51.9 kg (114 lb 6.4 oz).    Medication Review: complete      Health Care Directive:  Patient does not have a Health Care Directive: Discussed advance care planning with patient; however, patient declined at this time.    Missy Boyer LPN                                 [facility] Tracey Smith is a 34 year old female presents for evaluation of sinus symptoms.  Onset of symptoms 11/23 with cough and congestion.  Nose stuffed.  Felt worse the next day.  Tried some allergy medication and it seemed to help a bit.  "   She also had some body aches.    Had a positive COVID test one week ago.    No fever.  Then 3 days ago she started to get a headache.  Now her teeth/gums hurt too.  Yesterday her right cheek started to itch.    Used a Neti Pot this morning       Answers submitted by the patient for this visit:  Migraine Visit Questionnaire (Submitted on 12/3/2024)  Chief Complaint: Chronic problems general questions HPI Form  Headache Symptoms are: no change  How often are you getting headaches or migraines? : all day in the past 3 days  Are you able to do normal daily activities when you have a migraine?: No  Migraine Rescue/Relief Medications:: other  Effectiveness of rescue/relief medications:: I get some relief  Migraine Preventative Medications:: other  ER or UC Visits:: 0 times  General Questionnaire (Submitted on 12/3/2024)  Chief Complaint: Chronic problems general questions HPI Form  How many days per week do you miss taking your medication?: 0  General Concern (Submitted on 12/3/2024)  Chief Complaint: Chronic problems general questions HPI Form  What is the reason for your visit today?: sinus or ear infection  When did your symptoms begin?: 3-7 days ago  What are your symptoms?: right side pressure headache stuffiness shortness of breath  How would you describe these symptoms?: Severe  Are your symptoms:: Staying the same  Have you had these symptoms before?: No  Is there anything that makes you feel worse?: bending down and laying down  Is there anything that makes you feel better?: no  Questionnaire about: Chronic problems general questions HPI Form (Submitted on 12/3/2024)  Chief Complaint: Chronic problems general questions HPI Form      Current Outpatient Medications   Medication Sig Dispense Refill    amoxicillin-clavulanate (AUGMENTIN) 875-125 MG tablet Take 1 tablet by mouth 2 times daily for 7 days. 14 tablet 0    Multiple Vitamins-Minerals (LUTEIN-ZEAXANTHIN) TABS Take 1 tablet by mouth daily 20 mg-4 mg       "OVER-THE-COUNTER 1 Tab po every day of Protandim OTC      OVER-THE-COUNTER Omega 3 Plus (vitamin D, omega 3, omega 7), 2 capsule po every day/       No current facility-administered medications for this visit.     No past medical history on file.            Review of Systems           4/15/2019     4:00 PM 5/13/2019     3:00 PM 9/16/2019     4:00 PM   PHQ   PHQ-9 Total Score 1 5 4   Q9: Thoughts of better off dead/self-harm past 2 weeks Not at all Not at all Not at all         4/15/2019     4:00 PM 5/13/2019     3:00 PM 9/16/2019     4:00 PM   ALEJANDRO-7 SCORE   Total Score 1 6 5             Objective  /64 (BP Location: Right arm, Patient Position: Sitting, Cuff Size: Adult Regular)   Pulse 68   Temp 97.5  F (36.4  C) (Temporal)   Resp 12   Ht 1.638 m (5' 4.5\")   Wt 51.9 kg (114 lb 6.4 oz)   LMP 11/26/2024 (Exact Date)   SpO2 99%   Breastfeeding No   BMI 19.33 kg/m     Physical Exam   GENERAL: alert and no distress  EYES: Eyes grossly normal to inspection, PERRL and conjunctivae and sclerae normal  HENT: TMs within normal limits.  Moderate of nasal mucosa with purulent drainage.  She has right maxillary sinus tenderness, a little bit of right frontal tenderness.  NECK: no adenopathy, no asymmetry, masses, or scars  RESP: lungs clear to auscultation - no rales, rhonchi or wheezes  CV: Irregular rhythm                "

## 2025-03-09 ENCOUNTER — HEALTH MAINTENANCE LETTER (OUTPATIENT)
Age: 35
End: 2025-03-09